# Patient Record
Sex: FEMALE | Race: ASIAN | NOT HISPANIC OR LATINO | Employment: UNEMPLOYED | URBAN - METROPOLITAN AREA
[De-identification: names, ages, dates, MRNs, and addresses within clinical notes are randomized per-mention and may not be internally consistent; named-entity substitution may affect disease eponyms.]

---

## 2022-07-21 DIAGNOSIS — Z12.31 ENCOUNTER FOR SCREENING MAMMOGRAM FOR MALIGNANT NEOPLASM OF BREAST: Primary | ICD-10-CM

## 2022-09-08 ENCOUNTER — HOSPITAL ENCOUNTER (OUTPATIENT)
Dept: RADIOLOGY | Facility: HOSPITAL | Age: 62
Discharge: HOME | End: 2022-09-08
Attending: NURSE PRACTITIONER
Payer: MEDICAID

## 2022-09-08 DIAGNOSIS — Z12.31 ENCOUNTER FOR SCREENING MAMMOGRAM FOR MALIGNANT NEOPLASM OF BREAST: ICD-10-CM

## 2022-09-08 PROCEDURE — 77063 BREAST TOMOSYNTHESIS BI: CPT | Mod: TC,PO

## 2022-09-08 PROCEDURE — 77067 SCR MAMMO BI INCL CAD: CPT | Mod: TC,PO

## 2022-09-09 DIAGNOSIS — R92.8 ABNORMAL MAMMOGRAM: Primary | ICD-10-CM

## 2022-09-30 ENCOUNTER — HOSPITAL ENCOUNTER (OUTPATIENT)
Dept: RADIOLOGY | Facility: HOSPITAL | Age: 62
Discharge: HOME | End: 2022-09-30
Attending: NURSE PRACTITIONER
Payer: MEDICAID

## 2022-09-30 DIAGNOSIS — R92.8 ABNORMAL MAMMOGRAM: ICD-10-CM

## 2022-09-30 PROCEDURE — 76642 ULTRASOUND BREAST LIMITED: CPT | Mod: TC,PO,LT

## 2022-11-25 ENCOUNTER — HOSPITAL ENCOUNTER (OUTPATIENT)
Dept: RADIOLOGY | Facility: HOSPITAL | Age: 62
Discharge: HOME | End: 2022-11-25
Attending: STUDENT IN AN ORGANIZED HEALTH CARE EDUCATION/TRAINING PROGRAM
Payer: MEDICAID

## 2022-11-25 DIAGNOSIS — M54.2 CERVICALGIA: ICD-10-CM

## 2022-11-25 DIAGNOSIS — M54.6 THORACIC BACK PAIN: ICD-10-CM

## 2022-11-25 DIAGNOSIS — M54.2 CERVICALGIA: Primary | ICD-10-CM

## 2022-11-25 PROCEDURE — 72050 X-RAY EXAM NECK SPINE 4/5VWS: CPT | Mod: TC,PO

## 2022-11-25 PROCEDURE — 72070 X-RAY EXAM THORAC SPINE 2VWS: CPT | Mod: TC,PO

## 2023-01-05 DIAGNOSIS — R20.8 OTHER DISTURBANCES OF SKIN SENSATION: Primary | ICD-10-CM

## 2023-01-23 DIAGNOSIS — R92.8 ABNORMAL MAMMOGRAM: Primary | ICD-10-CM

## 2023-02-06 DIAGNOSIS — M25.562 PAIN IN LEFT KNEE: Primary | ICD-10-CM

## 2023-02-07 ENCOUNTER — HOSPITAL ENCOUNTER (OUTPATIENT)
Dept: RADIOLOGY | Facility: HOSPITAL | Age: 63
Discharge: HOME | End: 2023-02-07
Attending: NURSE PRACTITIONER
Payer: MEDICAID

## 2023-02-07 DIAGNOSIS — M25.562 PAIN IN LEFT KNEE: ICD-10-CM

## 2023-02-07 PROCEDURE — 73562 X-RAY EXAM OF KNEE 3: CPT | Mod: TC,PO,LT

## 2023-03-27 ENCOUNTER — HOSPITAL ENCOUNTER (OUTPATIENT)
Dept: RADIOLOGY | Facility: HOSPITAL | Age: 63
Discharge: HOME | End: 2023-03-27
Attending: NURSE PRACTITIONER
Payer: MEDICAID

## 2023-03-27 DIAGNOSIS — R92.8 ABNORMAL MAMMOGRAM: ICD-10-CM

## 2023-03-27 PROCEDURE — 76642 ULTRASOUND BREAST LIMITED: CPT | Mod: TC,PO,LT

## 2023-05-19 DIAGNOSIS — M51.37 DDD (DEGENERATIVE DISC DISEASE), LUMBOSACRAL: ICD-10-CM

## 2023-05-19 DIAGNOSIS — M17.11 PRIMARY OSTEOARTHRITIS OF RIGHT KNEE: ICD-10-CM

## 2023-05-19 DIAGNOSIS — M25.561 ACUTE PAIN OF RIGHT KNEE: Primary | ICD-10-CM

## 2023-06-12 ENCOUNTER — CLINICAL SUPPORT (OUTPATIENT)
Dept: REHABILITATION | Facility: HOSPITAL | Age: 63
End: 2023-06-12
Payer: MEDICAID

## 2023-06-12 DIAGNOSIS — M25.662 DECREASED RANGE OF MOTION OF LEFT KNEE: ICD-10-CM

## 2023-06-12 DIAGNOSIS — R29.898 IMPAIRED STRENGTH OF LOWER EXTREMITY: ICD-10-CM

## 2023-06-12 DIAGNOSIS — R29.818 IMPAIRED PROPRIOCEPTION: ICD-10-CM

## 2023-06-12 DIAGNOSIS — M53.86 DECREASED RANGE OF MOTION OF INTERVERTEBRAL DISCS OF LUMBAR SPINE: ICD-10-CM

## 2023-06-12 PROCEDURE — 97162 PT EVAL MOD COMPLEX 30 MIN: CPT | Mod: PN

## 2023-06-12 NOTE — PLAN OF CARE
[facility]   Physical Therapy Initial Evaluation      Name: Fadia Olson  Clinic Number: [id number]    Therapy Diagnosis:   Encounter Diagnoses   Name Primary?    Decreased range of motion of intervertebral discs of lumbar spine     Impaired strength of lower extremity     Decreased range of motion of left knee     Impaired proprioception         Physician: Tiffanie Lloyd FNP    Physician Orders: PT Eval and Treat   Medical Diagnosis from Referral: Acute pain of right knee [M25.561], Primary osteoarthritis of right knee [M17.11], DDD (degenerative disc disease), lumbosacral [M51.37]  Evaluation Date: 6/12/2023  Authorization Period Expiration: 12/31/23  Plan of Care Expiration: 9/31/23  Progress Note Due: 7/12/23  Visit # / Visits authorized: 1/ 1   FOTO: 1/1    Precautions: Standard , Cambodian speaking, non fluent in english, HTN    Time In: 2:30 pm  Time Out: 3:20 pm  Total Appointment Time (timed & untimed codes): 50 minutes    Subjective     Date of onset: 3 months    History of current condition - Fadia reports: Presents with son today to assist in translation,  services utilized  ID: [id number]    B knee pain started insidiously about 3 months ago, but L >> R. Pain is shooting primarily in left leg and knee, pain sometimes happens on R side but not often. Having muscle cramps in the calf as well, trying to do massage in the legs which seems to help as well as ice and heat. She denies B/B changes other than some urinary frequency occasionally, denies numbness and tingling in BLE. Does report history of back pain. Pt reports difficulty standing and walking, limited about 50% in duration due to the pain.     Falls: none in the past year     Imaging: xray: FINDINGS:3 views demonstrate no evidence of fracture, dislocation, or osseous destructive lesion. Mild osteophyte formation is noted at the medial, lateral, and patellofemoral compartments, with no significant joint space  narrowing. No significant effusion is identified.     IMPRESSION:  1. Mild tricompartmental osteoarthritic change. No acute findings.    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  HTN, HLD    Surgical History:   Fadia Olson  has no past surgical history on file.  None    Medications:   Fadia currently has no medications in their medication list.  Medication for HTN and HLD, not provided    Allergies:   Review of patient's allergies indicates:  Not on File   No    Objective      Repeated Motion Testing:    Symptoms Response   Repeated Flexion  decrease   Repeated Extension  increase   Left Sidebending  increase   Right Sidebending  no change     Lumbar ROM:    AROM    Flexion  Limited 25% pain   Extension  Limited 50% pain   Left Sidebending  Limited 50% pain   Right Sidbending  Limited 25%     Myotomes:    Left  Right   L2  5/5  5/5   L3  5/5  5/5   L4  5/5  5/5   L5  5/5  5/5   S1  4-/5  5/5   S2  4/5  5/5     Dermatomes:  Intact BLE    Strength:    Left  Right   Hip Abduction   4/5  4+/5   Hip Extension  3/5  4/5             Special Tests:    Left  Right    Sign of Buttock  negative negative   NAIMA negative negative   SLR positive negative   FADIR negative negative   Scour negative negative   Femoral Compression negative  negative   Log Roll negative negative   Femoral Nerve Tension Test negative negative   McMurrays  negative negative     Other:  Gait slightly antalgic    Knee AROM:  R 0-140* pain free  L 0-135* pain with + asterik sign and pain to medial joint line    Reflex:  +3 L patellar, 0 achilles L  +2 patellar R, +2 achilles R    SLS 10s B    Tenderness to medial joint line L knee    Passive Hip internal rotation + increase in LLE pain with + piriformis sign    Limitation/Restriction for FOTO Survey    Therapist reviewed FOTO scores for Fadia Olson on 6/12/2023.   FOTO documents entered into [employer] - see Media section.    Limitation Score: see chart%         Treatment     Total Treatment time (time-based codes) separate from Evaluation: 00 minutes     Patient Education and Home Exercises     Education provided:   - PT POC    Written Home Exercises Provided: no due to time coonstraines    Assessment     Fadia is a 62 y.o. female referred to outpatient Physical Therapy with a medical diagnosis of Acute pain of right knee [M25.561], Primary osteoarthritis of right knee [M17.11], DDD (degenerative disc disease), lumbosacral [M51.37]. Patient presents with impaired lumbar and L knee range of motion, impaired LLE strength, impaired reflexes on LLE, decreased neurodynamic mobility, and increased pain with activity limiting participation in ADL's. She would benefit from skilled PT intervention to address impairemtns to improve  patient tolerance to activity and increase participation in job and daily acitivites. Pt and her son verbalize understanding and in agreement with treatment plan.     Patient prognosis is Good.   Patient will benefit from skilled outpatient Physical Therapy to address the deficits stated above and in the chart below, provide patient /family education, and to maximize patientt's level of independence.     Plan of care discussed with patient: Yes  Patient's spiritual, cultural and educational needs considered and  patient is agreeable to the plan of care and goals as stated below:     Anticipated Barriers for therapy: language barrier    Medical Necessity is demonstrated by the following  History  Co-morbidities and personal factors that may impact the plan of care [] LOW: no personal factors / co-morbidities  [] MODERATE: 1-2 personal factors / co-morbidities  [x] HIGH: 3+ personal factors / co-morbidities    Moderate / High Support Documentation:   Co-morbidities affecting plan of care: HTN    Personal Factors:   Language barrier  Transporation assistance required  Lives far from PT location (discussed transfer of care if necessary to improve PT compliance)     Examination  Body Structures and Functions, activity limitations and participation restrictions that may impact the plan of care [] LOW: addressing 1-2 elements  [] MODERATE: 3+ elements  [x] HIGH: 4+ elements (please support below)    Moderate / High Support Documentation: limited back and knee range of motion, limited strength, impaired reflexes.   Limited gait, limited dressing, limited tolerance to standing.  Decreased participation in work duties, childcare duties, and community mobilty.      Clinical Presentation [] LOW: stable  [x] MODERATE: Evolving  [] HIGH: Unstable     Decision Making/ Complexity Score: moderate       Goals:  STGs: 3 weeks  Pt will demonstrate improvement of MMT strength grades to 4/5 where limited per evaluation to improve functional strength. (progressing, not met)  2.   Pt will demonstrate improvement in AROM to lumbar spine in all planes with <25% limitation without pain to improve functional range of motion.  (progressing, not met)  3.   Pt will report 4/10 pain at worst with usual ADLs including standing/walking 20 min or > demonstrating improvement in tolerance to self care, functional, and recreational activities. (progressing, not met)  4.   Pt will be independent with HEP(progressing, not met)    LTGs: 6 weeks  Pt will  demonstrate improvement of MMT strength grades to 4+/5 where limited per evaluation to improve functional strength. (progressing, not met)  2.   Pt will demonstrate improvement in AROM to lumbar spine in all planes with 0% limitation without pain to improve functional range of motion.  (progressing, not met)  3.   Pt will report 2/10 pain at worst with usual ADLs including standing/walking 30 min or > demonstrating improvement in tolerance to self care, functional, and recreational activities and return to work duties. (progressing, not met)  4.   Pt will be independent with HEP(progressing, not met)  Plan     Plan of care Certification: 6/12/2023 to 9/30/23.    Outpatient Physical Therapy 2 times weekly for 8 weeks to include the following interventions: Electrical Stimulation  , Gait Training, Manual Therapy, Moist Heat/ Ice, Neuromuscular Re-ed, Patient Education, Therapeutic Activities, Therapeutic Exercise, Ultrasound, and dry needling.     EVAN HOLLAND, PT

## 2023-06-12 NOTE — PROGRESS NOTES
[facility]   Physical Therapy Initial Evaluation      Name: Fadia Olson  Clinic Number: [id number]    Therapy Diagnosis:   Encounter Diagnoses   Name Primary?    Decreased range of motion of intervertebral discs of lumbar spine     Impaired strength of lower extremity     Decreased range of motion of left knee     Impaired proprioception         Physician: Tiffanie Lloyd FNP    Physician Orders: PT Eval and Treat   Medical Diagnosis from Referral: Acute pain of right knee [M25.561], Primary osteoarthritis of right knee [M17.11], DDD (degenerative disc disease), lumbosacral [M51.37]  Evaluation Date: 6/12/2023  Authorization Period Expiration: 12/31/23  Plan of Care Expiration: 9/31/23  Progress Note Due: 7/12/23  Visit # / Visits authorized: 1/ 1   FOTO: 1/1    Precautions: Standard , Cambodian speaking, non fluent in english, HTN    Time In: 2:30 pm  Time Out: 3:20 pm  Total Appointment Time (timed & untimed codes): 50 minutes    Subjective     Date of onset: 3 months    History of current condition - Fadia reports: Presents with son today to assist in translation,  services utilized  ID: [id number]    B knee pain started insidiously about 3 months ago, but L >> R. Pain is shooting primarily in left leg and knee, pain sometimes happens on R side but not often. Having muscle cramps in the calf as well, trying to do massage in the legs which seems to help as well as ice and heat. She denies B/B changes other than some urinary frequency occasionally, denies numbness and tingling in BLE. Does report history of back pain. Pt reports difficulty standing and walking, limited about 50% in duration due to the pain.     Falls: none in the past year     Imaging: xray: FINDINGS:3 views demonstrate no evidence of fracture, dislocation, or osseous destructive lesion. Mild osteophyte formation is noted at the medial, lateral, and patellofemoral compartments, with no significant joint space  narrowing. No significant effusion is identified.     IMPRESSION:  1. Mild tricompartmental osteoarthritic change. No acute findings.    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  HTN, HLD    Surgical History:   Fadia Olson  has no past surgical history on file.  None    Medications:   Fadia currently has no medications in their medication list.  Medication for HTN and HLD, not provided    Allergies:   Review of patient's allergies indicates:  Not on File   No    Objective      Repeated Motion Testing:    Symptoms Response   Repeated Flexion  decrease   Repeated Extension  increase   Left Sidebending  increase   Right Sidebending  no change     Lumbar ROM:    AROM    Flexion  Limited 25% pain   Extension  Limited 50% pain   Left Sidebending  Limited 50% pain   Right Sidbending  Limited 25%     Myotomes:    Left  Right   L2  5/5  5/5   L3  5/5  5/5   L4  5/5  5/5   L5  5/5  5/5   S1  4-/5  5/5   S2  4/5  5/5     Dermatomes:  Intact BLE    Strength:    Left  Right   Hip Abduction   4/5  4+/5   Hip Extension  3/5  4/5             Special Tests:    Left  Right    Sign of Buttock  negative negative   NAIMA negative negative   SLR positive negative   FADIR negative negative   Scour negative negative   Femoral Compression negative  negative   Log Roll negative negative   Femoral Nerve Tension Test negative negative   McMurrays  negative negative     Other:  Gait slightly antalgic    Knee AROM:  R 0-140* pain free  L 0-135* pain with + asterik sign and pain to medial joint line    Reflex:  +3 L patellar, 0 achilles L  +2 patellar R, +2 achilles R    SLS 10s B    Tenderness to medial joint line L knee    Passive Hip internal rotation + increase in LLE pain with + piriformis sign    Limitation/Restriction for FOTO Survey    Therapist reviewed FOTO scores for Fadia Olson on 6/12/2023.   FOTO documents entered into [employer] - see Media section.    Limitation Score: see chart%         Treatment     Total Treatment time (time-based codes) separate from Evaluation: 00 minutes     Patient Education and Home Exercises     Education provided:   - PT POC    Written Home Exercises Provided: no due to time coonstraines    Assessment     Fadia is a 62 y.o. female referred to outpatient Physical Therapy with a medical diagnosis of Acute pain of right knee [M25.561], Primary osteoarthritis of right knee [M17.11], DDD (degenerative disc disease), lumbosacral [M51.37]. Patient presents with impaired lumbar and L knee range of motion, impaired LLE strength, impaired reflexes on LLE, decreased neurodynamic mobility, and increased pain with activity limiting participation in ADL's. She would benefit from skilled PT intervention to address impairemtns to improve  patient tolerance to activity and increase participation in job and daily acitivites. Pt and her son verbalize understanding and in agreement with treatment plan.     Patient prognosis is Good.   Patient will benefit from skilled outpatient Physical Therapy to address the deficits stated above and in the chart below, provide patient /family education, and to maximize patientt's level of independence.     Plan of care discussed with patient: Yes  Patient's spiritual, cultural and educational needs considered and  patient is agreeable to the plan of care and goals as stated below:     Anticipated Barriers for therapy: language barrier    Medical Necessity is demonstrated by the following  History  Co-morbidities and personal factors that may impact the plan of care [] LOW: no personal factors / co-morbidities  [] MODERATE: 1-2 personal factors / co-morbidities  [x] HIGH: 3+ personal factors / co-morbidities    Moderate / High Support Documentation:   Co-morbidities affecting plan of care: HTN    Personal Factors:   Language barrier  Transporation assistance required  Lives far from PT location (discussed transfer of care if necessary to improve PT compliance)     Examination  Body Structures and Functions, activity limitations and participation restrictions that may impact the plan of care [] LOW: addressing 1-2 elements  [] MODERATE: 3+ elements  [x] HIGH: 4+ elements (please support below)    Moderate / High Support Documentation: limited back and knee range of motion, limited strength, impaired reflexes.   Limited gait, limited dressing, limited tolerance to standing.  Decreased participation in work duties, childcare duties, and community mobilty.      Clinical Presentation [] LOW: stable  [x] MODERATE: Evolving  [] HIGH: Unstable     Decision Making/ Complexity Score: moderate       Goals:  STGs: 3 weeks  Pt will demonstrate improvement of MMT strength grades to 4/5 where limited per evaluation to improve functional strength. (progressing, not met)  2.   Pt will demonstrate improvement in AROM to lumbar spine in all planes with <25% limitation without pain to improve functional range of motion.  (progressing, not met)  3.   Pt will report 4/10 pain at worst with usual ADLs including standing/walking 20 min or > demonstrating improvement in tolerance to self care, functional, and recreational activities. (progressing, not met)  4.   Pt will be independent with HEP(progressing, not met)    LTGs: 6 weeks  Pt will  demonstrate improvement of MMT strength grades to 4+/5 where limited per evaluation to improve functional strength. (progressing, not met)  2.   Pt will demonstrate improvement in AROM to lumbar spine in all planes with 0% limitation without pain to improve functional range of motion.  (progressing, not met)  3.   Pt will report 2/10 pain at worst with usual ADLs including standing/walking 30 min or > demonstrating improvement in tolerance to self care, functional, and recreational activities and return to work duties. (progressing, not met)  4.   Pt will be independent with HEP(progressing, not met)  Plan     Plan of care Certification: 6/12/2023 to 9/30/23.    Outpatient Physical Therapy 2 times weekly for 8 weeks to include the following interventions: Electrical Stimulation  , Gait Training, Manual Therapy, Moist Heat/ Ice, Neuromuscular Re-ed, Patient Education, Therapeutic Activities, Therapeutic Exercise, Ultrasound, and dry needling .     EVAN HOLLAND, PT

## 2023-06-14 ENCOUNTER — CLINICAL SUPPORT (OUTPATIENT)
Dept: REHABILITATION | Facility: HOSPITAL | Age: 63
End: 2023-06-14
Payer: MEDICAID

## 2023-06-14 DIAGNOSIS — M25.662 DECREASED RANGE OF MOTION OF LEFT KNEE: ICD-10-CM

## 2023-06-14 DIAGNOSIS — R29.818 IMPAIRED PROPRIOCEPTION: ICD-10-CM

## 2023-06-14 DIAGNOSIS — R29.898 IMPAIRED STRENGTH OF LOWER EXTREMITY: ICD-10-CM

## 2023-06-14 DIAGNOSIS — M53.86 DECREASED RANGE OF MOTION OF INTERVERTEBRAL DISCS OF LUMBAR SPINE: Primary | ICD-10-CM

## 2023-06-14 PROCEDURE — 97110 THERAPEUTIC EXERCISES: CPT | Mod: PN,CQ

## 2023-06-14 NOTE — PROGRESS NOTES
[facility]   Physical Therapy Treatment Note      Name: Fadia Olson  Clinic Number: [id number]    Therapy Diagnosis:   Encounter Diagnoses   Name Primary?    Decreased range of motion of intervertebral discs of lumbar spine Yes    Impaired strength of lower extremity     Decreased range of motion of left knee     Impaired proprioception      Physician: Tiffanie Lloyd FNP    Visit Date: 6/14/2023    Physician Orders: PT Eval and Treat   Medical Diagnosis from Referral: Acute pain of right knee [M25.561], Primary osteoarthritis of right knee [M17.11], DDD (degenerative disc disease), lumbosacral [M51.37]  Evaluation Date: 6/12/2023  Authorization Period Expiration: 8/14/2024  Plan of Care Expiration: 9/31/23  Progress Note Due: 7/12/23  Visit # / Visits authorized: 1/ 20 +eval  FOTO: 1/1     Precautions: Standard , Cambodian speaking, non fluent in english, HTN       PTA Visit #: 1/5     Time In: 0747   Time Out: 0833   Total Time: 46 minutes  Total Billable Time: 46 minutes    Subjective     Pt reports: left lateral leg pain 5/10 when she got up this morning, some pain with sleep and ices sometimes in her sleep and leaves it there.   .  She  n/a  compliant with home exercise program. (not yet issued)  Response to previous treatment: no complaints  Functional change: none stated (first visit after eval)      Pain: 5/10  Location: bilateral low back and left lateral leg    Objective     Lorie # [id number]    Objective Measures updated at progress report unless specified.     Treatment     Fadia received the treatments listed below:      therapeutic exercises to develop strength, endurance, ROM, flexibility, posture, and core stabilization for 5 minutes including:  DKTC with physioball x 10    manual therapy techniques: Soft tissue Mobilization were applied to the: bilateral low back and piriformis for 08 minutes      neuromuscular re-education activities to improve: Kinesthetic, Sense,  Proprioception, and Posture for 20 minutes. The following activities were included:  Posterior pelvic tilt x 15  Hip adduction isometrics x 15    therapeutic activities to improve functional performance for 13  minutes, including:  Education on importance of performing exercises and activities in pain-free ranges only, and to not push through pain.     Patient Education and Home Exercises       Education provided:   - Educated pt that he/she may feel soreness after session.        Written Home Exercises Provided: yes.     Exercises were reviewed and Fadia was able to demonstrate them prior to the end of the session.  Fadia demonstrated good  understanding of the education provided. See EMR under Patient Instructions for exercises provided during therapy sessions    Assessment     Pain improved with flexion based exercises with manual and verbal cues for abdominal bracing. Limited progressions 2* delay with interpretation times but pt seemed to like her exercises. Motivated. Improved pain reported after session.     Fadia Is progressing well towards her goals.   Pt prognosis is Good.     Pt will continue to benefit from skilled outpatient physical therapy to address the deficits listed in the problem list box on initial evaluation, provide pt/family education and to maximize pt's level of independence in the home and community environment.     Pt's spiritual, cultural and educational needs considered and pt agreeable to plan of care and goals.     Anticipated barriers to physical therapy: language barrier      Goals:   STGs: 3 weeks  Pt will demonstrate improvement of MMT strength grades to 4/5 where limited per evaluation to improve functional strength. (progressing, not met)  2.   Pt will demonstrate improvement in AROM to lumbar spine in all planes with <25% limitation without pain to improve functional range of motion.  (progressing, not met)  3.   Pt will report 4/10 pain at worst with usual ADLs including  standing/walking 20 min or > demonstrating improvement in tolerance to self care, functional, and recreational activities. (progressing, not met)  4.   Pt will be independent with HEP(progressing, not met)     LTGs: 6 weeks  Pt will demonstrate improvement of MMT strength grades to 4+/5 where limited per evaluation to improve functional strength. (progressing, not met)  2.   Pt will demonstrate improvement in AROM to lumbar spine in all planes with 0% limitation without pain to improve functional range of motion.  (progressing, not met)  3.   Pt will report 2/10 pain at worst with usual ADLs including standing/walking 30 min or > demonstrating improvement in tolerance to self care, functional, and recreational activities and return to work duties. (progressing, not met)  4.   Pt will be independent with HEP(progressing, not met)    Plan     Continue per POC, progressing as appropriate to achieve stated goals.    Continue with: Plan of care Certification: 6/12/2023 to 9/30/23.     Outpatient Physical Therapy 2 times weekly for 8 weeks to include the following interventions: Electrical Stimulation  , Gait Training, Manual Therapy, Moist Heat/ Ice, Neuromuscular Re-ed, Patient Education, Therapeutic Activities, Therapeutic Exercise, Ultrasound, and dry needling.       Chioma Aguilar, PTA

## 2023-06-26 ENCOUNTER — CLINICAL SUPPORT (OUTPATIENT)
Dept: REHABILITATION | Facility: HOSPITAL | Age: 63
End: 2023-06-26
Payer: MEDICAID

## 2023-06-26 DIAGNOSIS — M25.662 DECREASED RANGE OF MOTION OF LEFT KNEE: ICD-10-CM

## 2023-06-26 DIAGNOSIS — M53.86 DECREASED RANGE OF MOTION OF INTERVERTEBRAL DISCS OF LUMBAR SPINE: Primary | ICD-10-CM

## 2023-06-26 DIAGNOSIS — R29.898 IMPAIRED STRENGTH OF LOWER EXTREMITY: ICD-10-CM

## 2023-06-26 DIAGNOSIS — R29.818 IMPAIRED PROPRIOCEPTION: ICD-10-CM

## 2023-06-26 PROCEDURE — 97110 THERAPEUTIC EXERCISES: CPT | Mod: PN,CQ

## 2023-06-26 NOTE — PROGRESS NOTES
[facility]   Physical Therapy Treatment Note      Name: Fadia Olson  Clinic Number: [id number]    Therapy Diagnosis:   Encounter Diagnoses   Name Primary?    Decreased range of motion of intervertebral discs of lumbar spine Yes    Impaired strength of lower extremity     Decreased range of motion of left knee     Impaired proprioception        Physician: Tiffanie Lloyd FNP    Visit Date: 6/26/2023    Physician Orders: PT Eval and Treat   Medical Diagnosis from Referral: Acute pain of right knee [M25.561], Primary osteoarthritis of right knee [M17.11], DDD (degenerative disc disease), lumbosacral [M51.37]  Evaluation Date: 6/12/2023  Authorization Period Expiration: 8/14/2024  Plan of Care Expiration: 9/31/23  Progress Note Due: 7/12/23  Visit # / Visits authorized: 2/ 20 +eval  FOTO: 1/1     Precautions: Standard , Camb[place]ian speaking, non fluent in english, HTN       PTA Visit #: 2/5     Time In: 1530   Time Out: 1610   Total Time: 40 minutes  Total Billable Time: 40 minutes    Subjective     Pt reports: bilateral LB left leg pain 5/10. Decreased home exercise program performance since last session but still performing some of her exercises twice daily. Has a physioball at home but has not yet removed it from the packaging. Accompanied by son to session.   .  She was somewhat compliant with home exercise program.   Response to previous treatment: no complaints  Functional change: none stated       Pain: 5/10  Location: bilateral low back and left lateral leg    Objective     Tiara #[id number]    Objective Measures updated at progress report unless specified.     Treatment     Fadia received the treatments listed below:      therapeutic exercises to develop strength, endurance, ROM, flexibility, posture, and core stabilization for 13 minutes including:  DKTC with physioball x 20  LTR x 10  Hamstring stretch supine left-pain, so stopped  Piriformis stretch left 5 x 10s-comfort ranges  only    manual therapy techniques: Soft tissue Mobilization were applied to the: bilateral low back and piriformis for 3 minutes    Muscle energy technique for right AR pelvis correction x 2    neuromuscular re-education activities to improve: Kinesthetic, Sense, Proprioception, and Posture for 14 minutes. The following activities were included:  Transverse abdominis sets x 20  Posterior pelvic tilt x 10  Hip adduction isometrics x 20  Side lying clamshells x 10    therapeutic activities to improve functional performance for 10 minutes, including:  Education on importance of performing exercises and activities in pain-free ranges only, and to not push through pain.   Logrolling for back protection    Patient Education and Home Exercises       Education provided:   - Educated pt that he/she may feel soreness after session.        Written Home Exercises Provided: Yes, added to current home exercise program.     Exercises were reviewed and Fadia was able to demonstrate them prior to the end of the session.  Fadia demonstrated good  understanding of the education provided. See EMR under Patient Instructions for exercises provided during therapy sessions    Assessment     Right AR pelvis. Pain decreased to 3/10 and pelvic dysfunction corrected after muscle energy technique. Limited home exercise program compliance. Son is very active with pt's rehabilitation. Progressed strengthening with cues for proper form and muscle recruitment. Extra time spent on education of exercises and remaining in pain-free ranges to prevent exacerbation of symptoms.     Fadia Is progressing well towards her goals.   Pt prognosis is Good.     Pt will continue to benefit from skilled outpatient physical therapy to address the deficits listed in the problem list box on initial evaluation, provide pt/family education and to maximize pt's level of independence in the home and community environment.     Pt's spiritual, cultural and educational needs  considered and pt agreeable to plan of care and goals.     Anticipated barriers to physical therapy: language barrier      Goals:   STGs: 3 weeks  Pt will demonstrate improvement of MMT strength grades to 4/5 where limited per evaluation to improve functional strength. (progressing, not met)  2.   Pt will demonstrate improvement in AROM to lumbar spine in all planes with <25% limitation without pain to improve functional range of motion.  (progressing, not met)  3.   Pt will report 4/10 pain at worst with usual ADLs including standing/walking 20 min or > demonstrating improvement in tolerance to self care, functional, and recreational activities. (progressing, not met)  4.   Pt will be independent with HEP(progressing, not met)     LTGs: 6 weeks  Pt will demonstrate improvement of MMT strength grades to 4+/5 where limited per evaluation to improve functional strength. (progressing, not met)  2.   Pt will demonstrate improvement in AROM to lumbar spine in all planes with 0% limitation without pain to improve functional range of motion.  (progressing, not met)  3.   Pt will report 2/10 pain at worst with usual ADLs including standing/walking 30 min or > demonstrating improvement in tolerance to self care, functional, and recreational activities and return to work duties. (progressing, not met)  4.   Pt will be independent with HEP(progressing, not met)    Plan     Continue per POC, progressing as appropriate to achieve stated goals.    Continue with: Plan of care Certification: 6/12/2023 to 9/30/23.     Outpatient Physical Therapy 2 times weekly for 8 weeks to include the following interventions: Electrical Stimulation  , Gait Training, Manual Therapy, Moist Heat/ Ice, Neuromuscular Re-ed, Patient Education, Therapeutic Activities, Therapeutic Exercise, Ultrasound, and dry needling.       Chioma Aguilar, PTA

## 2023-06-28 ENCOUNTER — CLINICAL SUPPORT (OUTPATIENT)
Dept: REHABILITATION | Facility: HOSPITAL | Age: 63
End: 2023-06-28
Payer: MEDICAID

## 2023-06-28 DIAGNOSIS — R29.818 IMPAIRED PROPRIOCEPTION: ICD-10-CM

## 2023-06-28 DIAGNOSIS — M25.662 DECREASED RANGE OF MOTION OF LEFT KNEE: ICD-10-CM

## 2023-06-28 DIAGNOSIS — R29.898 IMPAIRED STRENGTH OF LOWER EXTREMITY: ICD-10-CM

## 2023-06-28 DIAGNOSIS — M53.86 DECREASED RANGE OF MOTION OF INTERVERTEBRAL DISCS OF LUMBAR SPINE: Primary | ICD-10-CM

## 2023-06-28 PROCEDURE — 97110 THERAPEUTIC EXERCISES: CPT | Mod: PN,CQ

## 2023-06-28 NOTE — PROGRESS NOTES
[facility]   Physical Therapy Treatment Note      Name: Fadia Olson  Clinic Number: [id number]    Therapy Diagnosis:   Encounter Diagnoses   Name Primary?    Decreased range of motion of intervertebral discs of lumbar spine Yes    Impaired strength of lower extremity     Decreased range of motion of left knee     Impaired proprioception          Physician: Tiffanie lLoyd FNP    Visit Date: 6/28/2023    Physician Orders: PT Eval and Treat   Medical Diagnosis from Referral: Acute pain of right knee [M25.561], Primary osteoarthritis of right knee [M17.11], DDD (degenerative disc disease), lumbosacral [M51.37]  Evaluation Date: 6/12/2023  Authorization Period Expiration: 8/14/2024  Plan of Care Expiration: 9/31/23  Progress Note Due: 7/12/23  Visit # / Visits authorized: 2/ 20 +eval  FOTO: 1/1     Precautions: Standard , [place]ian speaking, non fluent in english, HTN       PTA Visit #: 3/5     Time In: 1309   Time Out: 1608   Total Time: 59 minutes  Total Billable Time: 59 minutes    Subjective     Pt reports: 4/10 left low back and anterior right knee.  Accompanied by son to session.   .  She was compliant with home exercise program, twice daily.   Response to previous treatment: no complaints  Functional change: none stated       Pain: 4/10  Location: bilateral low back and left lateral leg    Objective     Grace #[id number]    Objective Measures updated at progress report unless specified.     Treatment     Fadia received the treatments listed below:      therapeutic exercises to develop strength, endurance, ROM, flexibility, posture, and core stabilization for 20 minutes including:  DKTC with physioball x 20  LTR x 10  Hamstring stretch supine left-5 x 10s  Piriformis stretch left 5 x 10s-comfort ranges only  Manual left piriformis stretches    manual therapy techniques: Soft tissue Mobilization were applied to the: bilateral low back and piriformis for 10 minutes.     Not needed this  date- Muscle energy technique for right AR pelvis correction x 2    neuromuscular re-education activities to improve: Kinesthetic, Sense, Proprioception, and Posture , and manual cues for core activation and decreased compensation for 29 minutes. The following activities were included:  Transverse abdominis sets x 20-moderate to maximal cues  Posterior pelvic tilt x 20-moderate to maximal cues  Hip adduction isometrics x 20  Side lying clamshells x 10  Posterior pelvic tilt with march supine x 10  Hip abduction, sidelying x 10    therapeutic activities to improve functional performance for 00 minutes, including:  Education on importance of performing exercises and activities in pain-free ranges only, and to not push through pain.   Logrolling for back protection    Patient Education and Home Exercises       Education provided:   - Educated pt that he/she may feel soreness after session.        Written Home Exercises Provided: Yes, added to current home exercise program.     Exercises were reviewed and Fadia was able to demonstrate them prior to the end of the session.  Fadia demonstrated good  understanding of the education provided. See EMR under Patient Instructions for exercises provided during therapy sessions    Assessment     Improving pain slowly. Improved pain with hamstring stretches today. Tenderness left piriformis with palpation.  Tightness left piriformis and iliotibial band improved with manual therapy. Level pelvis today. Good home exercise program compliance. Difficulty activating transverse abdominis today. Attempted setting with physioball for feedback, but pt felt discomfort in the QL area, so stopped.  Initial difficulty with Posterior pelvic tilt, and required moderate to maximal manual cues for Posterior pelvic tilt vs Anterior tilt. Son is very active with pt's rehabilitation. Progressed strengthening with cues for proper form and muscle recruitment. Improved pain to 3/10 and centralized to low  back only after session. Education of exercises and remaining in pain-free ranges to prevent exacerbation of symptoms and education of flexion based exercises for comfort.     Fadia Is progressing well towards her goals.   Pt prognosis is Good.     Pt will continue to benefit from skilled outpatient physical therapy to address the deficits listed in the problem list box on initial evaluation, provide pt/family education and to maximize pt's level of independence in the home and community environment.     Pt's spiritual, cultural and educational needs considered and pt agreeable to plan of care and goals.     Anticipated barriers to physical therapy: language barrier      Goals:   STGs: 3 weeks  Pt will demonstrate improvement of MMT strength grades to 4/5 where limited per evaluation to improve functional strength. (progressing, not met)  2.   Pt will demonstrate improvement in AROM to lumbar spine in all planes with <25% limitation without pain to improve functional range of motion.  (progressing, not met)  3.   Pt will report 4/10 pain at worst with usual ADLs including standing/walking 20 min or > demonstrating improvement in tolerance to self care, functional, and recreational activities. (progressing, not met)  4.   Pt will be independent with HEP(progressing, not met)     LTGs: 6 weeks  Pt will demonstrate improvement of MMT strength grades to 4+/5 where limited per evaluation to improve functional strength. (progressing, not met)  2.   Pt will demonstrate improvement in AROM to lumbar spine in all planes with 0% limitation without pain to improve functional range of motion.  (progressing, not met)  3.   Pt will report 2/10 pain at worst with usual ADLs including standing/walking 30 min or > demonstrating improvement in tolerance to self care, functional, and recreational activities and return to work duties. (progressing, not met)  4.   Pt will be independent with HEP(progressing, not met)    Plan     Continue  per POC, progressing as appropriate to achieve stated goals.    Continue with: Plan of care Certification: 6/12/2023 to 9/30/23.     Outpatient Physical Therapy 2 times weekly for 8 weeks to include the following interventions: Electrical Stimulation  , Gait Training, Manual Therapy, Moist Heat/ Ice, Neuromuscular Re-ed, Patient Education, Therapeutic Activities, Therapeutic Exercise, Ultrasound, and dry needling.       Chioma Aguilar, PTA

## 2023-07-03 ENCOUNTER — CLINICAL SUPPORT (OUTPATIENT)
Dept: REHABILITATION | Facility: HOSPITAL | Age: 63
End: 2023-07-03
Payer: MEDICAID

## 2023-07-03 DIAGNOSIS — R29.898 IMPAIRED STRENGTH OF LOWER EXTREMITY: ICD-10-CM

## 2023-07-03 DIAGNOSIS — M25.662 DECREASED RANGE OF MOTION OF LEFT KNEE: ICD-10-CM

## 2023-07-03 DIAGNOSIS — R29.818 IMPAIRED PROPRIOCEPTION: ICD-10-CM

## 2023-07-03 DIAGNOSIS — M53.86 DECREASED RANGE OF MOTION OF INTERVERTEBRAL DISCS OF LUMBAR SPINE: Primary | ICD-10-CM

## 2023-07-03 PROCEDURE — 97110 THERAPEUTIC EXERCISES: CPT | Mod: PN,CQ

## 2023-07-05 ENCOUNTER — CLINICAL SUPPORT (OUTPATIENT)
Dept: REHABILITATION | Facility: HOSPITAL | Age: 63
End: 2023-07-05
Payer: MEDICAID

## 2023-07-05 DIAGNOSIS — R29.818 IMPAIRED PROPRIOCEPTION: ICD-10-CM

## 2023-07-05 DIAGNOSIS — R29.898 IMPAIRED STRENGTH OF LOWER EXTREMITY: ICD-10-CM

## 2023-07-05 DIAGNOSIS — M25.662 DECREASED RANGE OF MOTION OF LEFT KNEE: ICD-10-CM

## 2023-07-05 DIAGNOSIS — M53.86 DECREASED RANGE OF MOTION OF INTERVERTEBRAL DISCS OF LUMBAR SPINE: Primary | ICD-10-CM

## 2023-07-05 PROCEDURE — 97110 THERAPEUTIC EXERCISES: CPT | Mod: PN

## 2023-07-05 NOTE — PROGRESS NOTES
[facility]   Physical Therapy Treatment Note/ Progress Note      Name: Fadia Olson  Clinic Number: [id number]    Therapy Diagnosis:   Encounter Diagnoses   Name Primary?    Decreased range of motion of intervertebral discs of lumbar spine Yes    Impaired strength of lower extremity     Decreased range of motion of left knee     Impaired proprioception        Physician: Tiffanie Lloyd FNP    Visit Date: 7/5/2023    Physician Orders: PT Eval and Treat   Medical Diagnosis from Referral: Acute pain of right knee [M25.561], Primary osteoarthritis of right knee [M17.11], DDD (degenerative disc disease), lumbosacral [M51.37]  Evaluation Date: 6/12/2023  Authorization Period Expiration: 8/14/2024  Plan of Care Expiration: 9/31/23  Progress Note Due: 8/5/23  Visit # / Visits authorized: 5/ 20 +eval  FOTO: 1/1, 7/3/2023 (4),      Precautions: Standard , [place]ian speaking, non fluent in english, HTN       PTA Visit #: 4/5     Time In: 320 pm  Time Out: 400 pm  Total Time: 40 minutes    Subjective     Pt reports: back pain Is feeling much better. After about 25 minutes of walking will get 3/10 knee pain that quickly resolves with rest and sitting.     She was compliant with home exercise program, twice daily.   Response to previous treatment: no complaints  Functional change: none stated       Pain: 0/10, worst 3/10 in knee  Location: bilateral low back and left lateral leg    Objective     [id number] Kira      Repeated Motion Testing:     Symptoms Response   Repeated Flexion  decrease   Repeated Extension  increase   Left Sidebending No change   Right Sidebending  no change      Lumbar ROM:     AROM    Flexion  Full no pain   Extension  Limited 25% pain   Left Sidebending  Full no pain   Right Sidbending  Limited 25% pain      Myotomes:     Left  Right   L2  5/5  5/5   L3  5/5  5/5   L4  5/5  5/5   L5  5/5  5/5   S1  4+/5  5/5   S2  4/5  5/5      Dermatomes:  Intact BLE     Strength:      Left  Right   Hip Abduction   4+/5  4+/5   Hip Extension  4/5  4+/5                  Other:  Gait unremarkable     Knee AROM:  R 0-140* pain free  L 0-140*     10 squats non painful, normal mechanics    Treatment     Fadia received the treatments listed below:      therapeutic exercises to develop strength, endurance, ROM, flexibility, posture, and core stabilization for 25 minutes including:  Assessment  Cat/cow x 20  Bridge  with PPT 2 x 10  Quadruped bird dog LE only 2 x 10  LTR x 20    manual therapy techniques: Soft tissue Mobilization were applied for 15 minutes.   Lumbar gapping mobilization L grade II     neuromuscular re-education activities to improve: Kinesthetic, Sense, Proprioception, and Posture , and manual cues for core activation and decreased compensation for 00 minutes. The following activities were included:  Transverse abdominis sets x 20-moderate to maximal cues  Posterior pelvic tilt x 20-moderate to maximal cues initially  Hip adduction isometrics x 20  Side lying clamshells x 10  Posterior pelvic tilt with march supine 2 x 10  Hip abduction, sidelying x 15  Articulating bridge x 10  Bent knee fall out, red band x 10  Paloff press, yellow tubing 1 x 10    therapeutic activities to improve functional performance for 00 minutes, including:  Education on importance of performing exercises and activities in pain-free ranges only, and to not push through pain.   Logrolling for back protection    Patient Education and Home Exercises       Education provided:   - Educated pt that he/she may feel soreness after session.        Written Home Exercises Provided: Yes, added to current home exercise program.     Exercises were reviewed and Fadia was able to demonstrate them prior to the end of the session.  Fadia demonstrated good  understanding of the education provided. See EMR under Patient Instructions for exercises provided during therapy sessions    Assessment     Fadia was re-assessed today,   used and son present. She is progressing with PT intervention and reaching her goals. She continues to lack full lumbar rnageo fmotion much improved following manual therapy technqies. Pt continues to demosntrate core and hip weakness that is improving, however lacks functional strength for proper movement mechanics. LLTT negative and improved neurodynamic mobility. Her HEP was updated and she required mod cues for proper performance but tolerated well without pain. Continue POC.     Fadia Is progressing well towards her goals.   Pt prognosis is Good.     Pt will continue to benefit from skilled outpatient physical therapy to address the deficits listed in the problem list box on initial evaluation, provide pt/family education and to maximize pt's level of independence in the home and community environment.     Pt's spiritual, cultural and educational needs considered and pt agreeable to plan of care and goals.     Anticipated barriers to physical therapy: language barrier      Goals:   STGs: 3 weeks  Pt will demonstrate improvement of MMT strength grades to 4/5 where limited per evaluation to improve functional strength. Met 7/5/23  2.   Pt will demonstrate improvement in AROM to lumbar spine in all planes with <25% limitation without pain to improve functional range of motion. Met 7/5/23  3.   Pt will report 4/10 pain at worst with usual ADLs including standing/walking 20 min or > demonstrating improvement in tolerance to self care, functional, and recreational activities. Met 7/5/23  4.   Pt will be independent with HEP met 7/5/23     LTGs: 6 weeks  Pt will demonstrate improvement of MMT strength grades to 4+/5 where limited per evaluation to improve functional strength.  Progressing 7/5  2.   Pt will demonstrate improvement in AROM to lumbar spine in all planes with 0% limitation without pain to improve functional range of motion. Progressing 7/5  3.   Pt will report 2/10 pain at worst with usual ADLs  including standing/walking 30 min or > demonstrating improvement in tolerance to self care, functional, and recreational activities and return to work duties. Progressing 7/5  4.   Pt will be independent with HEPProgressing 7/5    Plan     Continue per POC, progressing as appropriate to achieve stated goals.    Continue with: Plan of care Certification: 6/12/2023 to 9/30/23.     Outpatient Physical Therapy 2 times weekly for 8 weeks to include the following interventions: Electrical Stimulation  , Gait Training, Manual Therapy, Moist Heat/ Ice, Neuromuscular Re-ed, Patient Education, Therapeutic Activities, Therapeutic Exercise, Ultrasound, and dry needling.       EVAN HOLLAND, PT

## 2023-07-10 ENCOUNTER — CLINICAL SUPPORT (OUTPATIENT)
Dept: REHABILITATION | Facility: HOSPITAL | Age: 63
End: 2023-07-10
Payer: MEDICAID

## 2023-07-10 DIAGNOSIS — R29.898 IMPAIRED STRENGTH OF LOWER EXTREMITY: ICD-10-CM

## 2023-07-10 DIAGNOSIS — M25.662 DECREASED RANGE OF MOTION OF LEFT KNEE: ICD-10-CM

## 2023-07-10 DIAGNOSIS — M53.86 DECREASED RANGE OF MOTION OF INTERVERTEBRAL DISCS OF LUMBAR SPINE: Primary | ICD-10-CM

## 2023-07-10 DIAGNOSIS — R29.818 IMPAIRED PROPRIOCEPTION: ICD-10-CM

## 2023-07-10 PROCEDURE — 97110 THERAPEUTIC EXERCISES: CPT | Mod: PN

## 2023-07-10 NOTE — PROGRESS NOTES
[facility]   Physical Therapy Treatment Note/ Progress Note      Name: Fadia Olson  Clinic Number: [id number]    Therapy Diagnosis:   Encounter Diagnoses   Name Primary?    Decreased range of motion of intervertebral discs of lumbar spine Yes    Impaired strength of lower extremity     Decreased range of motion of left knee     Impaired proprioception          Physician: Tiffanie Lloyd FNP    Visit Date: 7/10/2023    Physician Orders: PT Eval and Treat   Medical Diagnosis from Referral: Acute pain of right knee [M25.561], Primary osteoarthritis of right knee [M17.11], DDD (degenerative disc disease), lumbosacral [M51.37]  Evaluation Date: 6/12/2023  Authorization Period Expiration: 8/14/2024  Plan of Care Expiration: 9/31/23  Progress Note Due: 8/5/23  Visit # / Visits authorized: 6/ 20 +eval  FOTO: 1/1, 7/3/2023 (4),      Precautions: Standard , [place]ian speaking, non fluent in english, HTN       PTA Visit #: 4/5     Time In: 323 pm  Time Out: 4:12 pm  Total Time: 40 minutes    Subjective     Pt reports: shoulders are very sore after quadruped exercises. Back and knee are feeling good, having no issues.     She was compliant with home exercise program, twice daily.   Response to previous treatment: no complaints  Functional change: none stated       Pain: 0/10, worst 3/10 in knee  Location: bilateral low back and left lateral leg    Objective     [id number] Syed      Treatment     Fadia received the treatments listed below:      therapeutic exercises to develop strength, endurance, ROM, flexibility, posture, and core stabilization for 10 minutes including:  Bridge with PPT 2 x 10  LTR x 20  Discontinue quadruped exercises to reduce UE soreness    manual therapy techniques: Soft tissue Mobilization were applied for 00 minutes.   Lumbar gapping mobilization L grade II     neuromuscular re-education activities to improve: Kinesthetic, Sense, Proprioception, and Posture , and manual cues  for core activation and decreased compensation for 30 minutes. The following activities were included:  TA set with 90/90 taps 2 x 10  Bridge with swiss ball with eccentric hamstring curl out 3 x 5  Side stepping YTB 3 x 6 B  Incline plank on elevated table to counter height with B marching with kickback 2 x 10 B  Sit<>stand with core bracing - cues required for hip hinging and bracing 2 x 10      therapeutic activities to improve functional performance for 00 minutes, including:  Education on importance of performing exercises and activities in pain-free ranges only, and to not push through pain.   Logrolling for back protection    Moist heat to thoracic/cervical spine x 10 minutes post session    Patient Education and Home Exercises       Education provided:   - Educated pt that he/she may feel soreness after session.        Written Home Exercises Provided: Yes, added to current home exercise program.     Exercises were reviewed and Fadia was able to demonstrate them prior to the end of the session.  Fadia demonstrated good  understanding of the education provided. See EMR under Patient Instructions for exercises provided during therapy sessions    Assessment     Fadia tolerated treatment progressions well today with good challenge and fatigue wihtout increase in pain. She requires moderate to occasional max TC for proper instruction/performance of her exercises but use of  and son very helpful for pt corrections. She continues to demosntrate most challenge with dynamic core stabilization and maintaining neutral spine. Continue to progress as tolerated.     Fadia Is progressing well towards her goals.   Pt prognosis is Good.     Pt will continue to benefit from skilled outpatient physical therapy to address the deficits listed in the problem list box on initial evaluation, provide pt/family education and to maximize pt's level of independence in the home and community environment.     Pt's spiritual,  cultural and educational needs considered and pt agreeable to plan of care and goals.     Anticipated barriers to physical therapy: language barrier      Goals:   STGs: 3 weeks  Pt will demonstrate improvement of MMT strength grades to 4/5 where limited per evaluation to improve functional strength. Met 7/5/23  2.   Pt will demonstrate improvement in AROM to lumbar spine in all planes with <25% limitation without pain to improve functional range of motion. Met 7/5/23  3.   Pt will report 4/10 pain at worst with usual ADLs including standing/walking 20 min or > demonstrating improvement in tolerance to self care, functional, and recreational activities. Met 7/5/23  4.   Pt will be independent with HEP met 7/5/23     LTGs: 6 weeks  Pt will demonstrate improvement of MMT strength grades to 4+/5 where limited per evaluation to improve functional strength.  Progressing 7/5  2.   Pt will demonstrate improvement in AROM to lumbar spine in all planes with 0% limitation without pain to improve functional range of motion. Progressing 7/5  3.   Pt will report 2/10 pain at worst with usual ADLs including standing/walking 30 min or > demonstrating improvement in tolerance to self care, functional, and recreational activities and return to work duties. Progressing 7/5  4.   Pt will be independent with HEPProgressing 7/5    Plan     Continue per POC, progressing as appropriate to achieve stated goals.    Continue with: Plan of care Certification: 6/12/2023 to 9/30/23.     Outpatient Physical Therapy 2 times weekly for 8 weeks to include the following interventions: Electrical Stimulation  , Gait Training, Manual Therapy, Moist Heat/ Ice, Neuromuscular Re-ed, Patient Education, Therapeutic Activities, Therapeutic Exercise, Ultrasound, and dry needling.       EVAN HOLLAND, PT

## 2023-07-17 ENCOUNTER — CLINICAL SUPPORT (OUTPATIENT)
Dept: REHABILITATION | Facility: HOSPITAL | Age: 63
End: 2023-07-17
Payer: MEDICAID

## 2023-07-17 DIAGNOSIS — R29.898 IMPAIRED STRENGTH OF LOWER EXTREMITY: ICD-10-CM

## 2023-07-17 DIAGNOSIS — M25.662 DECREASED RANGE OF MOTION OF LEFT KNEE: ICD-10-CM

## 2023-07-17 DIAGNOSIS — R29.818 IMPAIRED PROPRIOCEPTION: ICD-10-CM

## 2023-07-17 DIAGNOSIS — M53.86 DECREASED RANGE OF MOTION OF INTERVERTEBRAL DISCS OF LUMBAR SPINE: Primary | ICD-10-CM

## 2023-07-17 PROCEDURE — 97110 THERAPEUTIC EXERCISES: CPT | Mod: PN

## 2023-07-17 NOTE — PROGRESS NOTES
[facility]   Physical Therapy Treatment Note     Name: Fadia Olson  Clinic Number: [id number]    Therapy Diagnosis:   Encounter Diagnoses   Name Primary?    Decreased range of motion of intervertebral discs of lumbar spine Yes    Impaired strength of lower extremity     Decreased range of motion of left knee     Impaired proprioception          Physician: Tiffanie Lloyd FNP    Visit Date: 7/17/2023    Physician Orders: PT Eval and Treat   Medical Diagnosis from Referral: Acute pain of right knee [M25.561], Primary osteoarthritis of right knee [M17.11], DDD (degenerative disc disease), lumbosacral [M51.37]  Evaluation Date: 6/12/2023  Authorization Period Expiration: 8/14/2024  Plan of Care Expiration: 9/31/23  Progress Note Due: 8/5/23  Visit # / Visits authorized: 7/ 20 +eval  FOTO: 1/1, 7/3/2023 (4),      Precautions: Standard , [place]ian speaking, non fluent in english, HTN       PTA Visit #: 4/5     Time In: 320 pm  Time Out: 400 pm  Total Time: 40 minutes    Subjective     Pt reports: feeling great went back to work, still having knee discomfort, medial knee with walking >20 minutes or moving quickly and it resolves with rest. Back is feeling much better not having any pain with daily activities or work in back    She was compliant with home exercise program, twice daily.   Response to previous treatment: no complaints  Functional change: back to work with less symptoms      Pain: 0/10, 2/10 at worst in knee   Location: bilateral low back and left lateral leg    Objective     LY [id number]    Tenderness to adductor canal L  Valgus stress negative     + LLTT obturator and femoral nerve with reproduction of L medial knee pain  Treatment     Fadia received the treatments listed below:      therapeutic exercises to develop strength, endurance, ROM, flexibility, posture, and core stabilization for 10 minutes including:  HEP updates, patient education regarding neurodynamic mobility  with nerve symptoms, exercise rationale    manual therapy techniques: Soft tissue Mobilization were applied for 00 minutes.   Lumbar gapping mobilization L grade II     neuromuscular re-education activities to improve: Kinesthetic, Sense, Proprioception, and Posture , and manual cues for core activation and decreased compensation for 30 minutes. The following activities were included:  Femoral nerve glide x 20  Seated obturator nerve glide x 20  Bridge with eccentric hamstring slides 2 x 10  SLR with VMO bias x 10 (increased discomfort     therapeutic activities to improve functional performance for 00 minutes, including:  Education on importance of performing exercises and activities in pain-free ranges only, and to not push through pain.   Logrolling for back protection    Moist heat to thoracic/cervical spine x 00 minutes post session    Patient Education and Home Exercises       Education provided:   - Educated pt that he/she may feel soreness after session.    - do not stretch nerve past tolerance      Written Home Exercises Provided: Yes, added to current home exercise program.     Exercises were reviewed and Fadia was able to demonstrate them prior to the end of the session.  Fadia demonstrated good  understanding of the education provided. See EMR under Patient Instructions for exercises provided during therapy sessions    Assessment     Fadia demonstrates signs of lower limb nerve tension, she was progressed today to address mobility and nerve glides without symptom provocation. Patient to implement new HEP to address hamstring strengthening to improve rotational stability in knee, as well as nerve glides to decrease femoral and obturator nerve tension. She tolerated well with good form.  used today. She is to continue HEP with additions at this time and reduce treatment frequency to 1x per week due to progress.     Fadia Is progressing well towards her goals.   Pt prognosis is Good.     Pt will  continue to benefit from skilled outpatient physical therapy to address the deficits listed in the problem list box on initial evaluation, provide pt/family education and to maximize pt's level of independence in the home and community environment.     Pt's spiritual, cultural and educational needs considered and pt agreeable to plan of care and goals.     Anticipated barriers to physical therapy: language barrier      Goals:   STGs: 3 weeks  Pt will demonstrate improvement of MMT strength grades to 4/5 where limited per evaluation to improve functional strength. Met 7/5/23  2.   Pt will demonstrate improvement in AROM to lumbar spine in all planes with <25% limitation without pain to improve functional range of motion. Met 7/5/23  3.   Pt will report 4/10 pain at worst with usual ADLs including standing/walking 20 min or > demonstrating improvement in tolerance to self care, functional, and recreational activities. Met 7/5/23  4.   Pt will be independent with HEP met 7/5/23     LTGs: 6 weeks  Pt will demonstrate improvement of MMT strength grades to 4+/5 where limited per evaluation to improve functional strength.  Progressing 7/5  2.   Pt will demonstrate improvement in AROM to lumbar spine in all planes with 0% limitation without pain to improve functional range of motion. Progressing 7/5  3.   Pt will report 2/10 pain at worst with usual ADLs including standing/walking 30 min or > demonstrating improvement in tolerance to self care, functional, and recreational activities and return to work duties. Progressing 7/5  4.   Pt will be independent with HEPProgressing 7/5    Plan     Continue per POC, progressing as appropriate to achieve stated goals.    Continue with: Plan of care Certification: 6/12/2023 to 9/30/23.     Outpatient Physical Therapy 2 times weekly for 8 weeks to include the following interventions: Electrical Stimulation  , Gait Training, Manual Therapy, Moist Heat/ Ice, Neuromuscular Re-ed, Patient  Education, Therapeutic Activities, Therapeutic Exercise, Ultrasound, and dry needling.       EVAN HOLLAND, PT

## 2023-07-24 DIAGNOSIS — Z13.820 ENCOUNTER FOR OSTEOPOROSIS SCREENING IN ASYMPTOMATIC POSTMENOPAUSAL PATIENT: Primary | ICD-10-CM

## 2023-07-24 DIAGNOSIS — Z78.0 ENCOUNTER FOR OSTEOPOROSIS SCREENING IN ASYMPTOMATIC POSTMENOPAUSAL PATIENT: Primary | ICD-10-CM

## 2023-07-24 NOTE — PROGRESS NOTES
"[facility]   Physical Therapy Treatment Note     Name: Fadia Olson  Clinic Number: [id number]    Therapy Diagnosis:   Encounter Diagnoses   Name Primary?    Decreased range of motion of intervertebral discs of lumbar spine Yes    Impaired strength of lower extremity     Decreased range of motion of left knee     Impaired proprioception        Physician: Tiffanie Lloyd FNP    Visit Date: 7/25/2023    Physician Orders: PT Eval and Treat   Medical Diagnosis from Referral: Acute pain of right knee [M25.561], Primary osteoarthritis of right knee [M17.11], DDD (degenerative disc disease), lumbosacral [M51.37]  Evaluation Date: 6/12/2023  Authorization Period Expiration: 8/14/2024  Plan of Care Expiration: 9/31/23  Progress Note Due: 8/5/23  Visit # / Visits authorized: 8/ 20 +eval  FOTO: 1/1, 7/3/2023 (4),      Precautions: Standard , [place]ian speaking, non fluent in english, HTN       PTA Visit #: 1/5     Time In: 1047   Time Out: 1141   Total Time: 54 minutes  Total Billable Time: 54 minutes    Subjective     Pt reports: "Fell better." Light duty work: . Back is feeling better at work. Very light uncomfortable at work but not as bad. A little leg pain but back is okay today. Home exercise program twice a day. States she had left leg pain last session at 2/10 and now it is at 1/10.     She was compliant with home exercise program, twice daily.   Response to previous treatment: no complaints  Functional change: back to work with less symptoms      Pain: 1/10, 1/10 at worst in knee   Location: left leg    Objective     Viktoria #[id number]    Objective Measures updated at progress report unless specified.    Treatment     Fadia received the treatments listed below:      therapeutic exercises to develop strength, endurance, ROM, flexibility, posture, and core stabilization for 31 minutes including:  Reviewed HEP updates, patient education regarding neurodynamic mobility with nerve " symptoms, exercise rationale    LTR x 20  DKTC 5 x 10s  Piriformis stretch to opposite shoulder 10 x 10s    Reviewed new home exercise program issued prior session with heavy cues for proper form    manual therapy techniques: Soft tissue Mobilization were applied for 00 minutes.   Lumbar gapping mobilization L grade II     neuromuscular re-education activities to improve: Kinesthetic, Sense, Proprioception, and Posture , and manual cues for core activation and decreased compensation for 23 minutes. The following activities were included:  Femoral nerve glide x 20  Seated obturator nerve glide x 20  Bridge with eccentric hamstring slides 2 x 10  SLR with VMO bias x 10 (increased discomfort)    therapeutic activities to improve functional performance for 00 minutes, including:  Education on importance of performing exercises and activities in pain-free ranges only, and to not push through pain.   Logrolling for back protection    Moist heat to thoracic/cervical spine x 00 minutes post session    Patient Education and Home Exercises       Education provided:   - Educated pt that he/she may feel soreness after session.    - do not stretch nerve past tolerance      Written Home Exercises Provided: Instructed patient to continue current home exercise program.    Exercises were reviewed and Fadia was able to demonstrate them prior to the end of the session.  Fadia demonstrated good  understanding of the education provided. See EMR under Patient Instructions for exercises provided during therapy sessions    Assessment     Continued discomfort left LE, so performed nerve glides which did help to improve pain, but pt then felt tightness at medial thigh which improved with LE flexion based exercises. Difficulty with proper technique for obturator nerve glide, and extra time spent in education for proper performance. Reviewed home exercises for proper form and execution with heavy cues. Son present to assist pt at home and is  active participant in pt's therapy session.     Fadia Is progressing well towards her goals.   Pt prognosis is Good.     Pt will continue to benefit from skilled outpatient physical therapy to address the deficits listed in the problem list box on initial evaluation, provide pt/family education and to maximize pt's level of independence in the home and community environment.     Pt's spiritual, cultural and educational needs considered and pt agreeable to plan of care and goals.     Anticipated barriers to physical therapy: language barrier      Goals:   STGs: 3 weeks  Pt will demonstrate improvement of MMT strength grades to 4/5 where limited per evaluation to improve functional strength. Met 7/5/23  2.   Pt will demonstrate improvement in AROM to lumbar spine in all planes with <25% limitation without pain to improve functional range of motion. Met 7/5/23  3.   Pt will report 4/10 pain at worst with usual ADLs including standing/walking 20 min or > demonstrating improvement in tolerance to self care, functional, and recreational activities. Met 7/5/23  4.   Pt will be independent with HEP met 7/5/23     LTGs: 6 weeks  Pt will demonstrate improvement of MMT strength grades to 4+/5 where limited per evaluation to improve functional strength.  Progressing 7/5  2.   Pt will demonstrate improvement in AROM to lumbar spine in all planes with 0% limitation without pain to improve functional range of motion. Progressing 7/5  3.   Pt will report 2/10 pain at worst with usual ADLs including standing/walking 30 min or > demonstrating improvement in tolerance to self care, functional, and recreational activities and return to work duties. Progressing 7/5  4.   Pt will be independent with HEPProgressing 7/5    Plan     Continue per POC, progressing as appropriate to achieve stated goals.    Continue with: Plan of care Certification: 6/12/2023 to 9/30/23.     Outpatient Physical Therapy 2 times weekly for 8 weeks to include the  following interventions: Electrical Stimulation  , Gait Training, Manual Therapy, Moist Heat/ Ice, Neuromuscular Re-ed, Patient Education, Therapeutic Activities, Therapeutic Exercise, Ultrasound, and dry needling.       Chioma Aguilar, PTA

## 2023-07-25 ENCOUNTER — CLINICAL SUPPORT (OUTPATIENT)
Dept: REHABILITATION | Facility: HOSPITAL | Age: 63
End: 2023-07-25
Payer: MEDICAID

## 2023-07-25 DIAGNOSIS — M25.662 DECREASED RANGE OF MOTION OF LEFT KNEE: ICD-10-CM

## 2023-07-25 DIAGNOSIS — R29.818 IMPAIRED PROPRIOCEPTION: ICD-10-CM

## 2023-07-25 DIAGNOSIS — R29.898 IMPAIRED STRENGTH OF LOWER EXTREMITY: ICD-10-CM

## 2023-07-25 DIAGNOSIS — M53.86 DECREASED RANGE OF MOTION OF INTERVERTEBRAL DISCS OF LUMBAR SPINE: Primary | ICD-10-CM

## 2023-07-25 PROCEDURE — 97110 THERAPEUTIC EXERCISES: CPT | Mod: PN,CQ

## 2023-07-26 ENCOUNTER — HOSPITAL ENCOUNTER (OUTPATIENT)
Dept: RADIOLOGY | Facility: HOSPITAL | Age: 63
Discharge: HOME | End: 2023-07-26
Attending: NURSE PRACTITIONER
Payer: MEDICAID

## 2023-07-26 DIAGNOSIS — Z78.0 ENCOUNTER FOR OSTEOPOROSIS SCREENING IN ASYMPTOMATIC POSTMENOPAUSAL PATIENT: ICD-10-CM

## 2023-07-26 DIAGNOSIS — Z13.820 ENCOUNTER FOR OSTEOPOROSIS SCREENING IN ASYMPTOMATIC POSTMENOPAUSAL PATIENT: ICD-10-CM

## 2023-07-26 PROCEDURE — 77080 DXA BONE DENSITY AXIAL: CPT | Mod: TC,PO

## 2023-08-01 ENCOUNTER — CLINICAL SUPPORT (OUTPATIENT)
Dept: REHABILITATION | Facility: HOSPITAL | Age: 63
End: 2023-08-01
Payer: MEDICAID

## 2023-08-01 DIAGNOSIS — M25.662 DECREASED RANGE OF MOTION OF LEFT KNEE: ICD-10-CM

## 2023-08-01 DIAGNOSIS — R29.898 IMPAIRED STRENGTH OF LOWER EXTREMITY: ICD-10-CM

## 2023-08-01 DIAGNOSIS — M53.86 DECREASED RANGE OF MOTION OF INTERVERTEBRAL DISCS OF LUMBAR SPINE: Primary | ICD-10-CM

## 2023-08-01 DIAGNOSIS — R29.818 IMPAIRED PROPRIOCEPTION: ICD-10-CM

## 2023-08-01 PROCEDURE — 97110 THERAPEUTIC EXERCISES: CPT | Mod: PN

## 2023-08-01 NOTE — PROGRESS NOTES
[facility]   Physical Therapy Treatment Note/ Progress Note/ Discharge     Name: Fadia Olson  Clinic Number: [id number]    Therapy Diagnosis:   Encounter Diagnoses   Name Primary?    Decreased range of motion of intervertebral discs of lumbar spine Yes    Impaired strength of lower extremity     Decreased range of motion of left knee     Impaired proprioception        Physician: Tiffanie Lloyd FNP    Visit Date: 8/1/2023    Physician Orders: PT Eval and Treat   Medical Diagnosis from Referral: Acute pain of right knee [M25.561], Primary osteoarthritis of right knee [M17.11], DDD (degenerative disc disease), lumbosacral [M51.37]  Evaluation Date: 6/12/2023  Authorization Period Expiration: 8/14/2024  Plan of Care Expiration: 9/31/23  Progress Note Due: 8/5/23  Visit # / Visits authorized: 9/ 20 +eval  FOTO: 1/1, 7/3/2023 (4),      Precautions: Standard , [place]ian speaking, non fluent in english, HTN       PTA Visit #: 1/5     Time In: 230 pm  Time Out:  300 pm  Total Time: 30 minutes  Total Billable Time: 30 minutes    Subjective     Pt reports: Knee pain is much improved with new exercises. Feeling much more managed with exercise program. Unlimited in work duties, not having to lift heavy just 10# or so. Worst pain in knee is 1/10.     She was compliant with home exercise program, twice daily.   Response to previous treatment: no complaints  Functional change: able to do work and housework without difficulty      Pain: 0/10  Location: left leg    Objective     Kira #[id number]    OLumbar ROM:     AROM    Flexion  Full no pain   Extension  Limited 25% pain in glute- relieved fully post LTR exercise performance to full rom without pain   Left Sidebending  Full no pain   Right Sidbending  Full no pain      Myotomes:     Left  Right   L2  5/5  5/5   L3  5/5  5/5   L4  5/5  5/5   L5  5/5  5/5   S1  5/5  5/5   S2  5/5  5/5      Dermatomes:  Intact BLE     Strength:     Left  Right   Hip  Abduction   4+/5  4+/5   Hip Extension  4+/5  4+/5                   Treatment     Fadia received the treatments listed below:      therapeutic exercises to develop strength, endurance, ROM, flexibility, posture, and core stabilization for 30 minutes including:  Assessment  LTR x 20  Obturator nerve glides x 20  Prone femoral nerve glide x 20  Lifting mechanics 12# 2 x 5 with cues for retraining    Reviewed new home exercise program issued prior session with heavy cues for proper form    manual therapy techniques: Soft tissue Mobilization were applied for 00 minutes.   Lumbar gapping mobilization L grade II     neuromuscular re-education activities to improve: Kinesthetic, Sense, Proprioception, and Posture , and manual cues for core activation and decreased compensation for 00 minutes. The following activities were included:  Femoral nerve glide x 20  Seated obturator nerve glide x 20  Bridge with eccentric hamstring slides 2 x 10  SLR with VMO bias x 10 (increased discomfort)    therapeutic activities to improve functional performance for 00 minutes, including:  Education on importance of performing exercises and activities in pain-free ranges only, and to not push through pain.   Logrolling for back protection    Moist heat to thoracic/cervical spine x 00 minutes post session    Patient Education and Home Exercises       Education provided:   - Discharge instructions      Written Home Exercises Provided: Instructed patient to continue current home exercise program.    Exercises were reviewed and Fadia was able to demonstrate them prior to the end of the session.  Fadia demonstrated good  understanding of the education provided. See EMR under Patient Instructions for exercises provided during therapy sessions    Assessment     Fadia expressive of feeing ready for PT discharge today since symptoms are managed with HEP and back to work without symptoms. She was able to perform exercise program and lifting mechanics with  minimal cues and without symptoms. Son present today. She has met her goals, recommend discharge with HEP.     Fadia Is progressing well towards her goals.   Pt prognosis is Good.     Pt will continue to benefit from skilled outpatient physical therapy to address the deficits listed in the problem list box on initial evaluation, provide pt/family education and to maximize pt's level of independence in the home and community environment.     Pt's spiritual, cultural and educational needs considered and pt agreeable to plan of care and goals.     Anticipated barriers to physical therapy: language barrier      Goals:   STGs: 3 weeks  Pt will demonstrate improvement of MMT strength grades to 4/5 where limited per evaluation to improve functional strength. Met 7/5/23  2.   Pt will demonstrate improvement in AROM to lumbar spine in all planes with <25% limitation without pain to improve functional range of motion. Met 7/5/23  3.   Pt will report 4/10 pain at worst with usual ADLs including standing/walking 20 min or > demonstrating improvement in tolerance to self care, functional, and recreational activities. Met 7/5/23  4.   Pt will be independent with HEP met 7/5/23     LTGs: 6 weeks  Pt will demonstrate improvement of MMT strength grades to 4+/5 where limited per evaluation to improve functional strength.  Met 8/1/23  2.   Pt will demonstrate improvement in AROM to lumbar spine in all planes with 0% limitation without pain to improve functional range of motion. Met 8/1/23  3.   Pt will report 2/10 pain at worst with usual ADLs including standing/walking 30 min or > demonstrating improvement in tolerance to self care, functional, and recreational activities and return to work duties. Met 8/1/23  4.   Pt will be independent with HEP Met 8/1/23    Plan     Discharge    Continue with: Plan of care Certification: 6/12/2023 to 9/30/23.     Outpatient Physical Therapy 2 times weekly for 8 weeks to include the following  interventions: Electrical Stimulation  , Gait Training, Manual Therapy, Moist Heat/ Ice, Neuromuscular Re-ed, Patient Education, Therapeutic Activities, Therapeutic Exercise, Ultrasound, and dry needling.       EVAN HOLLAND, PT

## 2023-08-24 DIAGNOSIS — R92.8 OTHER ABNORMAL AND INCONCLUSIVE FINDINGS ON DIAGNOSTIC IMAGING OF BREAST: Primary | ICD-10-CM

## 2023-10-02 ENCOUNTER — HOSPITAL ENCOUNTER (OUTPATIENT)
Dept: RADIOLOGY | Facility: HOSPITAL | Age: 63
Discharge: HOME | End: 2023-10-02
Attending: NURSE PRACTITIONER
Payer: MEDICAID

## 2023-10-02 DIAGNOSIS — R92.8 OTHER ABNORMAL AND INCONCLUSIVE FINDINGS ON DIAGNOSTIC IMAGING OF BREAST: ICD-10-CM

## 2023-10-02 PROCEDURE — 77062 BREAST TOMOSYNTHESIS BI: CPT | Mod: TC,PO

## 2023-10-02 PROCEDURE — 76642 ULTRASOUND BREAST LIMITED: CPT | Mod: TC,PO,LT

## 2023-11-27 ENCOUNTER — HOSPITAL ENCOUNTER (EMERGENCY)
Facility: HOSPITAL | Age: 63
Discharge: HOME | End: 2023-11-27
Attending: EMERGENCY MEDICINE
Payer: MEDICAID

## 2023-11-27 VITALS
HEART RATE: 86 BPM | DIASTOLIC BLOOD PRESSURE: 88 MMHG | HEIGHT: 60 IN | WEIGHT: 125 LBS | SYSTOLIC BLOOD PRESSURE: 121 MMHG | RESPIRATION RATE: 16 BRPM | BODY MASS INDEX: 24.54 KG/M2 | TEMPERATURE: 100 F | OXYGEN SATURATION: 97 %

## 2023-11-27 DIAGNOSIS — U07.1 COVID-19: Primary | ICD-10-CM

## 2023-11-27 LAB
GROUP A STREP, MOLECULAR: NEGATIVE
INFLUENZA A, MOLECULAR: NEGATIVE
INFLUENZA B, MOLECULAR: NEGATIVE
SARS-COV-2 RDRP RESP QL NAA+PROBE: POSITIVE
SPECIMEN SOURCE: NORMAL

## 2023-11-27 PROCEDURE — 99284 EMERGENCY DEPT VISIT MOD MDM: CPT | Mod: 25

## 2023-11-27 PROCEDURE — 87502 INFLUENZA DNA AMP PROBE: CPT | Performed by: EMERGENCY MEDICINE

## 2023-11-27 PROCEDURE — 93010 ELECTROCARDIOGRAM REPORT: CPT | Mod: ,,, | Performed by: INTERNAL MEDICINE

## 2023-11-27 PROCEDURE — 87651 STREP A DNA AMP PROBE: CPT | Performed by: EMERGENCY MEDICINE

## 2023-11-27 PROCEDURE — 93005 ELECTROCARDIOGRAM TRACING: CPT | Performed by: INTERNAL MEDICINE

## 2023-11-27 PROCEDURE — U0002 COVID-19 LAB TEST NON-CDC: HCPCS | Performed by: EMERGENCY MEDICINE

## 2023-11-27 NOTE — ED PROVIDER NOTES
Encounter Date: 11/27/2023       History     Chief Complaint   Patient presents with    Cough     X 3 DAYS    Fever    Sore Throat     63-year-old female presents emergency department with family past medical history includes hypertension and hyperlipidemia patient does not speak English however agrees to allow family members to interpret.  Patient is [place]ian.  Family members report that she recently returned from [facility] after a visit and states that she is had cough and congestion with subjective fevers for the last 3 days she is been taking over-the-counter NyQuil, Motrin and Tylenol.  Patient has had no chest pain or shortness of breath.  The patient has been previously vaccinated from [employer] however she is had no boosters she is not received her flu vaccine this year.      Review of patient's allergies indicates:  No Known Allergies  Past Medical History:   Diagnosis Date    Hypertension     Mixed hyperlipidemia      No past surgical history on file.  Family History   Problem Relation Age of Onset    Breast cancer Sister         Review of Systems   Constitutional:  Positive for chills and fever.   HENT:  Positive for congestion, sneezing and sore throat.    Respiratory:  Positive for cough. Negative for shortness of breath.    Cardiovascular:  Negative for chest pain, palpitations and leg swelling.   Gastrointestinal: Negative.    Genitourinary: Negative.    Musculoskeletal: Negative.    Neurological: Negative.    Hematological: Negative.    Psychiatric/Behavioral: Negative.         Physical Exam     Initial Vitals [11/27/23 1010]   BP Pulse Resp Temp SpO2   127/88 84 20 100.3 °F (37.9 °C) 96 %      MAP       --         Physical Exam    Nursing note and vitals reviewed.  Constitutional: She appears well-developed and well-nourished.   HENT:   Head: Normocephalic and atraumatic.   Right Ear: External ear normal.   Left Ear: External ear normal.   Nose: Nose normal.   Mouth/Throat: No oropharyngeal exudate.    Eyes: Conjunctivae are normal. Pupils are equal, round, and reactive to light.   Cardiovascular:  Normal rate, regular rhythm, normal heart sounds and intact distal pulses.           Pulmonary/Chest: Breath sounds normal. No respiratory distress. She has no wheezes. She exhibits no tenderness.   Abdominal: Abdomen is soft.     Neurological: She is alert and oriented to person, place, and time. She has normal strength. GCS score is 15. GCS eye subscore is 4. GCS verbal subscore is 5. GCS motor subscore is 6.   Skin: Skin is warm. No rash noted.   Psychiatric: She has a normal mood and affect.         ED Course   Procedures  Labs Reviewed   SARS-COV-2 RNA AMPLIFICATION, QUAL - Abnormal; Notable for the following components:       Result Value    SARS-CoV-2 RNA, Amplification, Qual Positive (*)     All other components within normal limits   GROUP A STREP, MOLECULAR   INFLUENZA A AND B ANTIGEN    Narrative:     Specimen Source->Nasopharyngeal Swab        ECG Results              EKG 12-lead (In process)  Result time 11/27/23 10:50:18      In process by Interface, Lab In [facility] (11/27/23 10:50:18)                   Narrative:    Test Reason : R07.89,    Vent. Rate : 076 BPM     Atrial Rate : 076 BPM     P-R Int : 124 ms          QRS Dur : 066 ms      QT Int : 370 ms       P-R-T Axes : -18 056 052 degrees     QTc Int : 416 ms    Normal sinus rhythm  Nonspecific ST abnormality  Abnormal ECG  No previous ECGs available    Referred By: [email or web address]   SELF           Confirmed By:                                   Imaging Results              X-Ray Chest PA And Lateral (Final result)  Result time 11/27/23 11:01:54      Final result by Robbin Tim MD (11/27/23 11:01:54)                   Narrative:    2 view chest    CLINICAL DATA: Cough    FINDINGS: PA and lateral views show the heart to be within normal size limits. The mediastinum is unremarkable. The lungs are clear. No acute osseous abnormalities are  identified.    IMPRESSION:  1. No acute process.    Electronically signed by:  Robbin Tim MD  11/27/2023 11:01 AM CST Workstation: [id number]                                     Medications - No data to display  Medical Decision Making  63-year-old female presents emergency department with family past medical history includes hypertension and hyperlipidemia patient does not speak English however agrees to allow family members to interpret.  Patient is Camb[place]ian.  Family members report that she recently returned from [facility] after a visit and states that she is had cough and congestion with subjective fevers for the last 3 days she is been taking over-the-counter NyQuil, Motrin and Tylenol.  Patient has had no chest pain or shortness of breath.  The patient has been previously vaccinated from [employer] however she is had no boosters she is not received her flu vaccine this year.    Considerations include but not limited to COVID, influenza, pneumonia, strep pharyngitis    63-year-old female presents emergency department with complaint of cough congestion runny nose sore throat body aches and fevers for last 3 days after having a recent visit to [facility].  Strep testing and influenza testing are negative.  Patient did test positive for COVID chest x-ray was performed there is no evidence of pneumonia patient has vital signs are stable she is no obvious respiratory distress she does not desaturate with ambulation at this time I will discharge the patient home with return precautions her sons are with her and understand that must return if patient develops any worsening symptoms or develops respiratory distress or complaints of shortness of breath or chest pain.  She was also instructed on home quarantine for the next 5 days    Amount and/or Complexity of Data Reviewed  Labs: ordered. Decision-making details documented in ED Course.  Radiology: ordered. Decision-making details documented in ED Course.                                       Clinical Impression:  Final diagnoses:  [U07.1] COVID-19 (Primary)          ED Disposition Condition    Discharge Stable          ED Prescriptions    None       Follow-up Information       Follow up With Specialties Details Why Contact Info    Emilee Cohen NP Internal Medicine Schedule an appointment as soon as possible for a visit in 2 days  [address] [place] [address] [address] [id number]  [phone]               Hodan De Dios FNP  11/27/23 0889

## 2023-11-27 NOTE — DISCHARGE INSTRUCTIONS
, Tylenol every 4 hours for fever   Motrin every 6 hours for fever  Follow-up as directed   Return if condition becomes worse, you develop chest pain shortness of breath or any concerns  Home quarantine for the next 5 days

## 2024-01-30 DIAGNOSIS — M25.512 LEFT SHOULDER PAIN: Primary | ICD-10-CM

## 2024-02-01 ENCOUNTER — CLINICAL SUPPORT (OUTPATIENT)
Dept: REHABILITATION | Facility: HOSPITAL | Age: 64
End: 2024-02-01
Payer: MEDICAID

## 2024-02-01 DIAGNOSIS — M25.612 DECREASED RANGE OF MOTION OF LEFT SHOULDER: Primary | ICD-10-CM

## 2024-02-01 DIAGNOSIS — R29.898 DECREASED ROM OF NECK: ICD-10-CM

## 2024-02-01 DIAGNOSIS — R29.898 WEAKNESS OF SHOULDER: ICD-10-CM

## 2024-02-01 PROCEDURE — 97161 PT EVAL LOW COMPLEX 20 MIN: CPT | Mod: PN

## 2024-02-05 PROBLEM — R29.898 DECREASED ROM OF NECK: Status: ACTIVE | Noted: 2024-02-05

## 2024-02-05 NOTE — PLAN OF CARE
[facility]   Physical Therapy Initial Evaluation      Name: Fadia Olson  Clinic Number: [id number]    Therapy Diagnosis:   Encounter Diagnoses   Name Primary?    Decreased range of motion of left shoulder Yes    Weakness of shoulder     Decreased ROM of neck         Physician: Order, Paper    Physician Orders: PT Eval and Treat   Medical Diagnosis from Referral: M25.512 (ICD-10-CM) - Left shoulder pain   Evaluation Date: 2/1/2024  Authorization Period Expiration: 01/29/2025  Plan of Care Expiration: 03/28/2024  Progress Note Due: 03/02/2024  Date of Surgery: N/A  Visit # / Visits authorized: 1/ 1   FOTO: 1/ 3  Patient primarily speaks Cambodian,  service is needed.     Precautions: Standard     Time In: 12:50  Time Out: 1:45  Total Billable Time: 55 minutes    Subjective     Date of onset: When she went to [facility]     Mrs. Loaiza's son was present during evaluation and  service was used during therapy evaluation:    History of current condition - Fadia reports: She started experiencing anterior shoulder pain that radiates to her cervical spine and the posterior aspect of her shoulder along her scapula.    Falls: None     Imaging: none:    Prior Therapy: None   Social History: lives with family   Occupation: patient did not state a formal occupation  Prior Level of Function: independent with all task  Current Level of Function: as above     Pain:  Current 8/10, worst 8/10, best 1/10   Location: left shoulder   anterior shoulder and superior aspect of shoulder radiating into neck   Description: Aching and Throbbing  Aggravating Factors: Sitting, Standing, Touching, Extension, Flexing, and Lifting  Easing Factors: rest    Patients goals: To get better     Medical History:   Past Medical History:   Diagnosis Date    Hypertension     Mixed hyperlipidemia        Surgical History:   aFdia Olson  has no past surgical history on file.    Medications:   Fadia currently has no medications  in their medication list.    Allergies:   Review of patient's allergies indicates:  No Known Allergies     Objective    Observation: Hinging from lower cervical spine    Posture: protracted shoulders, forward head, depressed scapula on the Left     Cervical Range of Motion:    Degrees Pain Normal   Flexion 50 Y   80-90 deg   Extension 50 Y   normal ROM: 70-80 deg   Right Rotation 60 Y   70-90 degrees   Left Rotation 60 Y   70-90 degrees   Right Side Bending 25 Y 20-45 degrees   Left Side Bending 25 Y 20-45 degrees      Shoulder Range of Motion:   Shoulder Left Right   Flexion 160 160   Abduction 160 160   ER 90 90   IR 65 65     Upper Extremity Strength  (R) UE  (L) UE    Shoulder flexion: 5/5 Shoulder flexion: 5/5   Shoulder Abduction: 5/5 Shoulder abduction: 5/5   Shoulder ER 5/5 Shoulder ER 5/5   Shoulder IR 5/5 Shoulder IR 5/5   Elbow flexion: 5/5 Elbow flexion: 5/5   Elbow extension: 5/5 Elbow extension: 5/5   Wrist flexion: 5/5 Wrist flexion: 5/5   Wrist extension: 5/5 Wrist extension: 5/5   Serratus Anterior 5/5 Serratus Anterior  5/5   Lower Trap 5/5 Lower Trap 5/5   Middle Trap 5/5 Middle Trap 5/5       Special Tests:  Distraction -   Spurlings -   Vertebral Artery -   Martin -   Lateral Flexion Alar Ligament -   Transverse Ligament -   Shoulder Abduction Test -   Quadrant Testing -     Cervical joint mobility:    C0-C1 Flex/Ext Limited below  15 degrees   C0-C1 Sidebending Limted  5 degrees    C1-2 Rotation limited 45 degrees   C1-3 Rotation Limted  60 degrees    Side glides  Limited           Reflexes: Not assessed at this time     Thoracic mobility: decreased thoracic PA from T1-T7    Palpation: tenderness to palpation noted along upper trap      Sensation: intact to light touch in BUE     Neural tension: Negative    Intake Outcome Measure for FOTO Cervical Survey    Therapist reviewed FOTO scores for Fadia Olson on 2/1/2024.   FOTO report - see Media section or FOTO account episode details.    Intake  Score: Not appropriate for FOTO          Treatment     Total Treatment time (time-based codes) separate from Evaluation: 23 minutes     Fadia received the treatments listed below:      manual therapy techniques: Joint mobilizations were applied to the: cervical spine for 8 minutes, including:  Cervical upglides on the Right (Right to left)     therapeutic activities to improve functional performance for 15  minutes, including:  HEP education:  Chin tuck: x10   Wall slides: x10  Prone Mid trap level 2: x10    Patient Education and Home Exercises     Education provided:   - HEP education  - POC education    Written Home Exercises Provided: yes. Exercises were reviewed and Fadia was able to demonstrate them prior to the end of the session.  Fadia demonstrated fair  understanding of the education provided. See EMR under Patient Instructions for exercises provided during therapy sessions.    Assessment     Fadia is a 63 y.o. female referred to outpatient Physical Therapy with a medical diagnosis of M25.512 (ICD-10-CM) - Left shoulder pain . Patient presents with signs and symptoms consistent with cervical facet syndrome. Fadia presents with abnormal posture, decreased cervical and thoracic spine range of motion and weakness in her shoulders and periscapular muscles. This is leading her to produce abnormal movements from her cervical spine, resulting in a closing restriction on the Left side of her cervical spine. Couple with this she has an anterior glide of her Left humeral head placing increased tension on the structures in that area. These deficits are limiting her with work related task, self care and ADLs. She makes a good candidate for skilled Physical Therapy to improve the above listed deficits.     Patient prognosis is Good.   Patient will benefit from skilled outpatient Physical Therapy to address the deficits stated above and in the chart below, provide patient /family education, and to maximize patientt's level of  independence.     Plan of care discussed with patient: Yes  Patient's spiritual, cultural and educational needs considered and patient is agreeable to the plan of care and goals as stated below:     Anticipated Barriers for therapy: Needs  at this time.    Medical Necessity is demonstrated by the following  History  Co-morbidities and personal factors that may impact the plan of care [] LOW: no personal factors / co-morbidities  [x] MODERATE: 1-2 personal factors / co-morbidities  [] HIGH: 3+ personal factors / co-morbidities    Moderate / High Support Documentation:   Co-morbidities affecting plan of care:     Personal Factors:   Requires a      Examination  Body Structures and Functions, activity limitations and participation restrictions that may impact the plan of care [x] LOW: addressing 1-2 elements  [] MODERATE: 3+ elements  [] HIGH: 4+ elements (please support below)    Moderate / High Support Documentation:      Clinical Presentation [x] LOW: stable  [] MODERATE: Evolving  [] HIGH: Unstable     Decision Making/ Complexity Score: low       Goals:  Short Term Goals: 4 weeks. Pt agrees with goals set.  Pt will demonstrate independence and compliance with initial HEP to improve independence and symptom management.   Pt will report Left shoulder pain </= 5/10 with active range of motion to demonstrate improved condition and ability to complete her self care and house hold task.    Pt will improve MMT of her scapular upward rotators to >/= 3+/5 to improve tolerance for reaching overhead, self care and doing work related task.    Pt will improve cervical spine ROM by >= 25 % to improve tolerance for looking Left and Right, to improve capacity to drive and complete her ADLs.      Long Term Goals: 8 weeks. Pt agrees with goals set.   Pt will demonstrate independence and compliance with final HEP to continue managing symptoms and developing mobility, motor control and strength.    Pt will report  shoulder pain </= 2/10  with reaching overhead, active range of motion of cervical spine and reaching behind her back to demonstrate improved condition and ability to complete her self care, ADLs and work related task.    Pt will improve MMT of periscapular muscles to >/= 4-/5 to improve her static posture and improve her ability to reach over head and complete her ADLs, self care and participate in leisurely activities.    Pt will improve cervical spine ROM by >= 25 % to improve tolerance for looking Left and Right, to improve capacity to drive and complete her ADLs.     Pt goal: To get better     Plan     Plan of care Certification: 2/1/2024 to 03/28/2024.    Outpatient Physical Therapy 2 times weekly for 8 weeks to include the following interventions: Electrical Stimulation NMES, Gait Training, Manual Therapy, Moist Heat/ Ice, Neuromuscular Re-ed, Patient Education, Self Care, Therapeutic Activities, and Therapeutic Exercise.     Guillermo Hernandez, PT, DPT        Physician's Signature: _________________________________________ Date: ________________

## 2024-02-07 ENCOUNTER — CLINICAL SUPPORT (OUTPATIENT)
Dept: REHABILITATION | Facility: HOSPITAL | Age: 64
End: 2024-02-07
Payer: MEDICAID

## 2024-02-07 DIAGNOSIS — R29.898 DECREASED ROM OF NECK: ICD-10-CM

## 2024-02-07 DIAGNOSIS — R29.898 WEAKNESS OF SHOULDER: ICD-10-CM

## 2024-02-07 DIAGNOSIS — M25.612 DECREASED RANGE OF MOTION OF LEFT SHOULDER: Primary | ICD-10-CM

## 2024-02-07 PROCEDURE — 97110 THERAPEUTIC EXERCISES: CPT | Mod: PN

## 2024-02-07 NOTE — PROGRESS NOTES
[facility] AND [facility]   Physical Therapy Treatment Note     Name: Fadia Olson  Clinic Number: [id number]    Therapy Diagnosis:   Encounter Diagnoses   Name Primary?    Decreased range of motion of left shoulder Yes    Weakness of shoulder     Decreased ROM of neck      Physician: Manisha Dumont FNP-C    Visit Date: 2/7/2024    Physician Orders: PT Eval and Treat   Medical Diagnosis from Referral: M25.512 (ICD-10-CM) - Left shoulder pain   Evaluation Date: 2/1/2024  Authorization Period Expiration: 03/28/2024  Plan of Care Expiration: 03/28/2024  Progress Note Due: 03/02/2024  Date of Surgery: N/A  Visit # / Visits authorized: 1/ 16   FOTO: 1/ 3  Patient primarily speaks Cambodian,  service is needed.      Precautions: Standard     PTA Visit #: 0/5     FOTO first follow up:   FOTO second follow up:     Time In: 1:00  Time Out: 1:54  Total Billable Time: 54 minutes    SUBJECTIVE     Pt reports: She is doing okay, her neck is a lot better, but this area still hurts a little..  She was compliant with home exercise program.  Response to previous treatment: improved range of motion and decreased apin   Functional change: as above     Pain: 3/10  Location: left shoulder  (anterior and superior aspect)    OBJECTIVE     Objective Measures updated at progress report unless specified.     Treatment     Fadia received the treatments listed below:      therapeutic exercises to develop strength, endurance, and ROM for 6 minutes including:  UBE 3'/3'    manual therapy techniques: Joint mobilizations were applied to the: cervical spine for 9 minutes, including:  Cervical upglides grade I-III  Lumbrical flick to the CT junction     neuromuscular re-education activities to improve: Balance, Coordination, Kinesthetic, Sense, and Proprioception for 12 minutes. The following activities were included:  Supine chin tuck 2 x 20   Upper trap level 1 2 x 20, 5 second hold     therapeutic activities to improve functional  performance for 25  minutes, including:  Prone middle trap level 2, 3 x 10   Prone lower trap level 2, 3 x 10   Shoulder ER/IR walkout with red theraband 2 x20  SA wall slides with red theraband cuff 3 x 12   SA land mines with white dowel and 3# wieght, 3 x 12 on the left upper extremity       Patient Education and Home Exercises     Home Exercises Provided and Patient Education Provided     Education provided:   - HEP education  - POC education     Written Home Exercises Provided: Patient instructed to cont prior HEP. Exercises were reviewed and Fadia was able to demonstrate them prior to the end of the session.  Fadia demonstrated good  understanding of the education provided. See EMR under Patient Instructions for exercises provided during therapy sessions    ASSESSMENT     Fadia completed therapy with no complications. Therapy focused on developing her scapular upward rotator motor control and developing her cervical spine mobility. She tolerated all progressions well and states that she is feeling better at this time with occasional instances of pain. Therapy will continue to progress as tolerated.    Fadia Is progressing well towards her goals.   Pt prognosis is Good.     Pt will continue to benefit from skilled outpatient physical therapy to address the deficits listed in the problem list box on initial evaluation, provide pt/family education and to maximize pt's level of independence in the home and community environment.     Pt's spiritual, cultural and educational needs considered and pt agreeable to plan of care and goals.     Anticipated barriers to physical therapy: none noted at this time.    Goals:  Short Term Goals: 4 weeks. Pt agrees with goals set.  Pt will demonstrate independence and compliance with initial HEP to improve independence and symptom management.   Pt will report Left shoulder pain </= 5/10 with active range of motion to demonstrate improved condition and ability to complete her self  care and house hold task.    Pt will improve MMT of her scapular upward rotators to >/= 3+/5 to improve tolerance for reaching overhead, self care and doing work related task.    Pt will improve cervical spine ROM by >= 25 % to improve tolerance for looking Left and Right, to improve capacity to drive and complete her ADLs.       Long Term Goals: 8 weeks. Pt agrees with goals set.   Pt will demonstrate independence and compliance with final HEP to continue managing symptoms and developing mobility, motor control and strength.    Pt will report shoulder pain </= 2/10  with reaching overhead, active range of motion of cervical spine and reaching behind her back to demonstrate improved condition and ability to complete her self care, ADLs and work related task.    Pt will improve MMT of periscapular muscles to >/= 4-/5 to improve her static posture and improve her ability to reach over head and complete her ADLs, self care and participate in leisurely activities.    Pt will improve cervical spine ROM by >= 25 % to improve tolerance for looking Left and Right, to improve capacity to drive and complete her ADLs.     Pt goal: To get better       PLAN     Develop cervical spine mobility and further develop periscapular strength.     Guillermo Hernandez, PT, DPT

## 2024-02-09 ENCOUNTER — CLINICAL SUPPORT (OUTPATIENT)
Dept: REHABILITATION | Facility: HOSPITAL | Age: 64
End: 2024-02-09
Payer: MEDICAID

## 2024-02-09 DIAGNOSIS — R29.898 WEAKNESS OF SHOULDER: ICD-10-CM

## 2024-02-09 DIAGNOSIS — R29.898 DECREASED ROM OF NECK: ICD-10-CM

## 2024-02-09 DIAGNOSIS — M25.612 DECREASED RANGE OF MOTION OF LEFT SHOULDER: Primary | ICD-10-CM

## 2024-02-09 PROCEDURE — 97110 THERAPEUTIC EXERCISES: CPT | Mod: PN

## 2024-02-09 NOTE — PROGRESS NOTES
[facility]   Physical Therapy Treatment Note     Name: Fadia [facility]  Clinic Number: [id number]    Therapy Diagnosis:   Encounter Diagnoses   Name Primary?    Decreased range of motion of left shoulder Yes    Weakness of shoulder     Decreased ROM of neck        Physician: Manisha Dumont FNP-C    Visit Date: 2/9/2024    Physician Orders: PT Eval and Treat   Medical Diagnosis from Referral: M25.512 (ICD-10-CM) - Left shoulder pain   Evaluation Date: 2/1/2024  Authorization Period Expiration: 03/28/2024  Plan of Care Expiration: 03/28/2024  Progress Note Due: 03/02/2024  Date of Surgery: N/A  Visit # / Visits authorized: 1/ 16   FOTO: 1/ 3  Patient primarily speaks [place]ian,  service is needed.      Precautions: Standard     PTA Visit #: 0/5     FOTO first follow up:   FOTO second follow up:     Time In: 12:30  Time Out: 1:12  Total Billable Time: 42 minutes    SUBJECTIVE     Pt reports: She is doing a lot better, just a little pain.   She was compliant with home exercise program.  Response to previous treatment: improved range of motion and decreased apin   Functional change: as above     Pain: 3/10  Location: left shoulder  (anterior and superior aspect)    OBJECTIVE     Objective Measures updated at progress report unless specified.     Treatment     Fadia received the treatments listed below:      therapeutic exercises to develop strength, endurance, and ROM for 00 minutes including:  UBE 3'/3'    manual therapy techniques: Joint mobilizations were applied to the: cervical spine for 00 minutes, including:  Cervical upglides grade I-III  Lumbrical flick to the CT junction     neuromuscular re-education activities to improve: Balance, Coordination, Kinesthetic, Sense, and Proprioception for 15 minutes. The following activities were included:  Supine chin tuck 2 x 20   Seated chin tuck x30  Upper trap level 1 2 x 20, 5 second hold   Standing row at cable machine 3 x 10, 7# B      therapeutic activities to improve functional performance for 27  minutes, including:  Prone middle trap level 2, 3 x 12   Prone lower trap level 2, 3 x 12  SA wall slides with red theraband cuff 3 x 12   SA land mines with white dowel and 3# wieght, 3 x 12 on the left upper extremity   Lifting 16# box on to 33in shelf, x6 reps       Patient Education and Home Exercises     Home Exercises Provided and Patient Education Provided     Education provided:   - HEP education  - POC education     Written Home Exercises Provided: Patient instructed to cont prior HEP. Exercises were reviewed and Fadia was able to demonstrate them prior to the end of the session.  Fadia demonstrated good  understanding of the education provided. See EMR under Patient Instructions for exercises provided during therapy sessions    ASSESSMENT     Fadia is doing well at this time; She has normal joint mobility and improved cervical spine and shoulder mobility. Therapy progressed her strengthening and she tolerated all progressions. She report no issues with lifting overhead. Therapy will continue to progress as necessary.     Fadia Is progressing well towards her goals.   Pt prognosis is Good.     Pt will continue to benefit from skilled outpatient physical therapy to address the deficits listed in the problem list box on initial evaluation, provide pt/family education and to maximize pt's level of independence in the home and community environment.     Pt's spiritual, cultural and educational needs considered and pt agreeable to plan of care and goals.     Anticipated barriers to physical therapy: none noted at this time.    Goals:  Short Term Goals: 4 weeks. Pt agrees with goals set.  Pt will demonstrate independence and compliance with initial HEP to improve independence and symptom management.   Pt will report Left shoulder pain </= 5/10 with active range of motion to demonstrate improved condition and ability to complete her self care and  house hold task.    Pt will improve MMT of her scapular upward rotators to >/= 3+/5 to improve tolerance for reaching overhead, self care and doing work related task.    Pt will improve cervical spine ROM by >= 25 % to improve tolerance for looking Left and Right, to improve capacity to drive and complete her ADLs.       Long Term Goals: 8 weeks. Pt agrees with goals set.   Pt will demonstrate independence and compliance with final HEP to continue managing symptoms and developing mobility, motor control and strength.    Pt will report shoulder pain </= 2/10  with reaching overhead, active range of motion of cervical spine and reaching behind her back to demonstrate improved condition and ability to complete her self care, ADLs and work related task.    Pt will improve MMT of periscapular muscles to >/= 4-/5 to improve her static posture and improve her ability to reach over head and complete her ADLs, self care and participate in leisurely activities.    Pt will improve cervical spine ROM by >= 25 % to improve tolerance for looking Left and Right, to improve capacity to drive and complete her ADLs.     Pt goal: To get better       PLAN     Develop cervical spine mobility and further develop periscapular strength.     Guillermo Hernandez, PT, DPT

## 2024-07-31 DIAGNOSIS — Z12.31 ENCOUNTER FOR SCREENING MAMMOGRAM FOR MALIGNANT NEOPLASM OF BREAST: Primary | ICD-10-CM

## 2024-08-27 DIAGNOSIS — R10.13 ABDOMINAL PAIN, EPIGASTRIC: Primary | ICD-10-CM

## 2024-10-04 ENCOUNTER — HOSPITAL ENCOUNTER (OUTPATIENT)
Dept: RADIOLOGY | Facility: HOSPITAL | Age: 64
Discharge: HOME | End: 2024-10-04
Attending: NURSE PRACTITIONER
Payer: MEDICAID

## 2024-10-04 DIAGNOSIS — Z12.31 ENCOUNTER FOR SCREENING MAMMOGRAM FOR MALIGNANT NEOPLASM OF BREAST: ICD-10-CM

## 2024-10-04 PROCEDURE — 77063 BREAST TOMOSYNTHESIS BI: CPT | Mod: 26,,, | Performed by: RADIOLOGY

## 2024-10-04 PROCEDURE — 77067 SCR MAMMO BI INCL CAD: CPT | Mod: TC,PO

## 2024-10-04 PROCEDURE — 77067 SCR MAMMO BI INCL CAD: CPT | Mod: 26,,, | Performed by: RADIOLOGY

## 2024-10-22 ENCOUNTER — PATIENT MESSAGE (OUTPATIENT)
Dept: RESEARCH | Facility: HOSPITAL | Age: 64
End: 2024-10-22
Payer: MEDICAID

## 2025-02-03 DIAGNOSIS — N63.20 UNSPECIFIED LUMP IN THE LEFT BREAST, UNSPECIFIED QUADRANT: Primary | ICD-10-CM

## 2025-02-03 DIAGNOSIS — N64.4 MASTODYNIA: ICD-10-CM

## 2025-03-20 ENCOUNTER — HOSPITAL ENCOUNTER (OUTPATIENT)
Dept: RADIOLOGY | Facility: HOSPITAL | Age: 65
Discharge: HOME | End: 2025-03-20
Attending: NURSE PRACTITIONER
Payer: MEDICAID

## 2025-03-20 VITALS — WEIGHT: 125 LBS | BODY MASS INDEX: 24.54 KG/M2 | HEIGHT: 60 IN

## 2025-03-20 DIAGNOSIS — N64.4 MASTODYNIA: ICD-10-CM

## 2025-03-20 DIAGNOSIS — N63.20 UNSPECIFIED LUMP IN THE LEFT BREAST, UNSPECIFIED QUADRANT: ICD-10-CM

## 2025-03-20 PROCEDURE — 77065 DX MAMMO INCL CAD UNI: CPT | Mod: TC,PO,LT

## 2025-03-20 PROCEDURE — 76642 ULTRASOUND BREAST LIMITED: CPT | Mod: TC,PO,LT

## 2025-03-20 PROCEDURE — 76642 ULTRASOUND BREAST LIMITED: CPT | Mod: 26,LT,, | Performed by: RADIOLOGY

## 2025-03-20 PROCEDURE — 77061 BREAST TOMOSYNTHESIS UNI: CPT | Mod: 26,LT,, | Performed by: RADIOLOGY

## 2025-03-20 PROCEDURE — 77065 DX MAMMO INCL CAD UNI: CPT | Mod: 26,LT,, | Performed by: RADIOLOGY
